# Patient Record
Sex: FEMALE | Race: WHITE | NOT HISPANIC OR LATINO
[De-identification: names, ages, dates, MRNs, and addresses within clinical notes are randomized per-mention and may not be internally consistent; named-entity substitution may affect disease eponyms.]

---

## 2019-05-13 PROBLEM — Z00.00 ENCOUNTER FOR PREVENTIVE HEALTH EXAMINATION: Status: ACTIVE | Noted: 2019-05-13

## 2019-06-08 ENCOUNTER — LABORATORY RESULT (OUTPATIENT)
Age: 49
End: 2019-06-08

## 2019-06-08 ENCOUNTER — OUTPATIENT (OUTPATIENT)
Dept: OUTPATIENT SERVICES | Facility: HOSPITAL | Age: 49
LOS: 1 days | Discharge: HOME | End: 2019-06-08

## 2019-06-08 DIAGNOSIS — Z01.419 ENCOUNTER FOR GYNECOLOGICAL EXAMINATION (GENERAL) (ROUTINE) WITHOUT ABNORMAL FINDINGS: ICD-10-CM

## 2019-06-12 ENCOUNTER — APPOINTMENT (OUTPATIENT)
Dept: OBGYN | Facility: CLINIC | Age: 49
End: 2019-06-12

## 2019-06-25 ENCOUNTER — LABORATORY RESULT (OUTPATIENT)
Age: 49
End: 2019-06-25

## 2019-06-25 ENCOUNTER — OUTPATIENT (OUTPATIENT)
Dept: OUTPATIENT SERVICES | Facility: HOSPITAL | Age: 49
LOS: 1 days | Discharge: HOME | End: 2019-06-25

## 2019-06-25 ENCOUNTER — APPOINTMENT (OUTPATIENT)
Dept: OBGYN | Facility: CLINIC | Age: 49
End: 2019-06-25
Payer: COMMERCIAL

## 2019-06-25 VITALS — BODY MASS INDEX: 22.82 KG/M2 | WEIGHT: 137 LBS | HEIGHT: 65 IN

## 2019-06-25 PROCEDURE — 99396 PREV VISIT EST AGE 40-64: CPT

## 2019-06-26 DIAGNOSIS — Z01.419 ENCOUNTER FOR GYNECOLOGICAL EXAMINATION (GENERAL) (ROUTINE) WITHOUT ABNORMAL FINDINGS: ICD-10-CM

## 2019-07-18 ENCOUNTER — APPOINTMENT (OUTPATIENT)
Dept: INFUSION THERAPY | Facility: CLINIC | Age: 49
End: 2019-07-18
Payer: COMMERCIAL

## 2019-07-18 ENCOUNTER — LABORATORY RESULT (OUTPATIENT)
Age: 49
End: 2019-07-18

## 2019-07-18 ENCOUNTER — APPOINTMENT (OUTPATIENT)
Dept: HEMATOLOGY ONCOLOGY | Facility: CLINIC | Age: 49
End: 2019-07-18
Payer: COMMERCIAL

## 2019-07-18 VITALS
BODY MASS INDEX: 23.32 KG/M2 | RESPIRATION RATE: 14 BRPM | HEIGHT: 65 IN | TEMPERATURE: 97.1 F | WEIGHT: 140 LBS | SYSTOLIC BLOOD PRESSURE: 127 MMHG | DIASTOLIC BLOOD PRESSURE: 69 MMHG | HEART RATE: 73 BPM

## 2019-07-18 PROCEDURE — 99215 OFFICE O/P EST HI 40 MIN: CPT

## 2019-07-18 RX ORDER — HYDROCORTISONE 20 MG
100 TABLET ORAL ONCE
Refills: 0 | Status: COMPLETED | OUTPATIENT
Start: 2019-07-18 | End: 2019-07-18

## 2019-07-18 RX ORDER — FERUMOXYTOL 510 MG/17ML
510 INJECTION INTRAVENOUS ONCE
Refills: 0 | Status: COMPLETED | OUTPATIENT
Start: 2019-07-18 | End: 2019-07-18

## 2019-07-18 RX ADMIN — Medication 150 MILLIGRAM(S): at 17:01

## 2019-07-18 RX ADMIN — FERUMOXYTOL 468 MILLIGRAM(S): 510 INJECTION INTRAVENOUS at 17:01

## 2019-07-18 NOTE — PHYSICAL EXAM
[Normal] : normoactive bowel sounds, soft and nontender, no hepatosplenomegaly or masses appreciated [de-identified] : no tenderness.

## 2019-07-18 NOTE — ASSESSMENT
[FreeTextEntry1] : 48 year old female with iron deficiency ( ferritin : 5 ) , slightly symptomatic , intolerant to po iron and with questionable history of  IBD . She is scheduled for colonoscopy . In the interim will recommend iron repletion with feraheme 510 mg X 2 . discussed the potential adverse effects especially mild hypersensitivity reaction . will check celiac serology and B12 . All her questions were addressed.

## 2019-07-18 NOTE — HISTORY OF PRESENT ILLNESS
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 Last colonoscopy >5 years ago showed non-specific colitis . She denies any pain or steatorrhea  at this time . Last blood work shows Hb: 10.5 , ferritin : 6 .\par She is intolerant to po iron .

## 2019-07-19 LAB
HCT VFR BLD CALC: 39.1 %
HGB BLD-MCNC: 11.9 G/DL
MCHC RBC-ENTMCNC: 23.8 PG
MCHC RBC-ENTMCNC: 30.4 G/DL
MCV RBC AUTO: 78 FL
PLATELET # BLD AUTO: 213 K/UL
PMV BLD: 12.4 FL
RBC # BLD: 5.01 M/UL
RBC # FLD: 15.2 %
VIT B12 SERPL-MCNC: 435 PG/ML
WBC # FLD AUTO: 7.77 K/UL

## 2019-07-21 LAB
GLIADIN IGA SER QL: 5.3 UNITS
GLIADIN IGG SER QL: 8.4 UNITS
GLIADIN PEPTIDE IGA SER-ACNC: NEGATIVE
GLIADIN PEPTIDE IGG SER-ACNC: NEGATIVE

## 2019-07-22 LAB — METHYLMALONATE SERPL-SCNC: 218 NMOL/L

## 2019-07-24 LAB
ENDOMYSIUM IGA SER QL: NEGATIVE
ENDOMYSIUM IGA TITR SER: NORMAL

## 2019-07-25 ENCOUNTER — APPOINTMENT (OUTPATIENT)
Dept: INFUSION THERAPY | Facility: CLINIC | Age: 49
End: 2019-07-25

## 2019-07-25 ENCOUNTER — OUTPATIENT (OUTPATIENT)
Dept: OUTPATIENT SERVICES | Facility: HOSPITAL | Age: 49
LOS: 1 days | Discharge: HOME | End: 2019-07-25

## 2019-07-25 DIAGNOSIS — D64.9 ANEMIA, UNSPECIFIED: ICD-10-CM

## 2019-07-25 RX ORDER — FERUMOXYTOL 510 MG/17ML
510 INJECTION INTRAVENOUS ONCE
Refills: 0 | Status: COMPLETED | OUTPATIENT
Start: 2019-07-25 | End: 2019-07-25

## 2019-07-25 RX ORDER — HYDROCORTISONE 20 MG
100 TABLET ORAL ONCE
Refills: 0 | Status: COMPLETED | OUTPATIENT
Start: 2019-07-25 | End: 2019-07-25

## 2019-07-25 RX ADMIN — Medication 150 MILLIGRAM(S): at 17:20

## 2019-07-25 RX ADMIN — FERUMOXYTOL 510 MILLIGRAM(S): 510 INJECTION INTRAVENOUS at 18:10

## 2019-07-25 RX ADMIN — FERUMOXYTOL 468 MILLIGRAM(S): 510 INJECTION INTRAVENOUS at 17:40

## 2019-07-25 RX ADMIN — Medication 100 MILLIGRAM(S): at 17:40

## 2019-09-19 ENCOUNTER — APPOINTMENT (OUTPATIENT)
Dept: HEMATOLOGY ONCOLOGY | Facility: CLINIC | Age: 49
End: 2019-09-19

## 2020-06-21 ENCOUNTER — EMERGENCY (EMERGENCY)
Facility: HOSPITAL | Age: 50
LOS: 0 days | Discharge: HOME | End: 2020-06-21
Attending: EMERGENCY MEDICINE | Admitting: EMERGENCY MEDICINE
Payer: COMMERCIAL

## 2020-06-21 VITALS
TEMPERATURE: 99 F | SYSTOLIC BLOOD PRESSURE: 127 MMHG | DIASTOLIC BLOOD PRESSURE: 78 MMHG | OXYGEN SATURATION: 99 % | HEART RATE: 75 BPM | RESPIRATION RATE: 17 BRPM | WEIGHT: 134.92 LBS | HEIGHT: 65 IN

## 2020-06-21 DIAGNOSIS — S61.412A LACERATION WITHOUT FOREIGN BODY OF LEFT HAND, INITIAL ENCOUNTER: ICD-10-CM
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
## 2020-06-21 PROCEDURE — 99283 EMERGENCY DEPT VISIT LOW MDM: CPT | Mod: 25

## 2020-06-21 PROCEDURE — 12001 RPR S/N/AX/GEN/TRNK 2.5CM/<: CPT

## 2020-06-21 NOTE — ED PROVIDER NOTE - CARE PROVIDER_API CALL
Urgent care or PMD,   10 days for suture removal  Phone: (   )    -  Fax: (   )    -  Follow Up Time:

## 2020-06-21 NOTE — ED PROVIDER NOTE - ATTENDING CONTRIBUTION TO CARE
50 yo F presents with c/o laceration to the left hand sustained PTA on a vase.  Pt states vase fell and broke when it hit the faucet, no numbness or tingling.  On exam pt in NAD AAO x 3, + laceration to dorsum left hand overlying 3rd MC. Tendon seen and intact, + 2nd laceration dorsum hand, + superficial laceration to left pinky, Good ROM x all digits, sensation intact

## 2020-06-21 NOTE — ED PROVIDER NOTE - PROVIDER TOKENS
FREE:[LAST:[Urgent care or PMD],PHONE:[(   )    -],FAX:[(   )    -],ADDRESS:[10 days for suture removal]]

## 2020-06-21 NOTE — ED PROVIDER NOTE - OBJECTIVE STATEMENT
50 yo female, no pmh, utd, presents to ed for left hand lac, dorsum, mild, no active bleeding, cut on vase. denies limited rom, numbness, tingling.

## 2020-06-21 NOTE — ED PROVIDER NOTE - PATIENT PORTAL LINK FT
You can access the FollowMyHealth Patient Portal offered by [facility] by registering at the following website: [email or web address]. By joining [employer]’s FollowMyHealth portal, you will also be able to view your health information using other applications (apps) compatible with our system.

## 2020-06-21 NOTE — ED PROVIDER NOTE - PHYSICAL EXAMINATION
Physical Exam    Vital Signs: I have reviewed the initial vital signs.  Constitutional: well-nourished, appears stated age, no acute distress  Eyes: Conjunctiva pink, Sclera clear,   Cardiovascular:  radial pulses equal and 2+  Musculoskeletal: from of left hand, no evidence of tendon injury  Integumentary: 1 cm lac to dorsum of left hand with another .3 cm lac to left hand on dorsum side.   Neurologic: awake, alert, nvi

## 2020-11-18 ENCOUNTER — LABORATORY RESULT (OUTPATIENT)
Age: 50
End: 2020-11-18

## 2020-11-19 ENCOUNTER — APPOINTMENT (OUTPATIENT)
Dept: OBGYN | Facility: CLINIC | Age: 50
End: 2020-11-19
Payer: COMMERCIAL

## 2020-11-19 VITALS — TEMPERATURE: 97.7 F | WEIGHT: 156 LBS | HEIGHT: 65 IN | BODY MASS INDEX: 25.99 KG/M2

## 2020-11-19 DIAGNOSIS — N63.0 UNSPECIFIED LUMP IN UNSPECIFIED BREAST: ICD-10-CM

## 2020-11-19 PROCEDURE — 99396 PREV VISIT EST AGE 40-64: CPT

## 2020-12-07 ENCOUNTER — NON-APPOINTMENT (OUTPATIENT)
Age: 50
End: 2020-12-07

## 2020-12-09 ENCOUNTER — RESULT REVIEW (OUTPATIENT)
Age: 50
End: 2020-12-09

## 2020-12-09 ENCOUNTER — OUTPATIENT (OUTPATIENT)
Dept: OUTPATIENT SERVICES | Facility: HOSPITAL | Age: 50
LOS: 1 days | Discharge: HOME | End: 2020-12-09
Payer: COMMERCIAL

## 2020-12-09 PROCEDURE — 88342 IMHCHEM/IMCYTCHM 1ST ANTB: CPT | Mod: 26

## 2020-12-09 PROCEDURE — 19082 BX BREAST ADD LESION STRTCTC: CPT | Mod: LT

## 2020-12-09 PROCEDURE — 19081 BX BREAST 1ST LESION STRTCTC: CPT | Mod: LT

## 2020-12-09 PROCEDURE — 88305 TISSUE EXAM BY PATHOLOGIST: CPT | Mod: 26

## 2020-12-09 PROCEDURE — 88341 IMHCHEM/IMCYTCHM EA ADD ANTB: CPT | Mod: 26

## 2020-12-10 ENCOUNTER — NON-APPOINTMENT (OUTPATIENT)
Age: 50
End: 2020-12-10

## 2020-12-11 LAB — SURGICAL PATHOLOGY STUDY: SIGNIFICANT CHANGE UP

## 2020-12-11 NOTE — HISTORY OF PRESENT ILLNESS
[FreeTextEntry1] : Stereo Guided Core Bx - 12/09/2020:\par Left, lateral Ca++: (mini-cork)\par Single microscopic focus of atypical lobular hyperplasia\par (ALH), measuring 0.8 mm (microscopic measurement) in this biopsy\par material (see comment).\par -  Proliferative type fibrocystic changes including usual type\par duct hyperplasia, stromal fibrosis, sclerosing and blunt duct\par adenosis, columnar cell change/hyperplasia without atypia, and\par numerous microcalcifications present in benign ducts.\par -  Unremarkable skeletal muscle.\par -  Radiographic/pathologic correlation with the specimen's\par digital image viewed on PACS is performed.\par \par Comment: The diagnosis is supported by immunohistochemical stain\par negative and/or weak/diminished for E-cadherin in the focus of\par in-situ atypical lobular neoplasia (ALH and/or LCIS).\par \par Left, medial Ca++: (top-hat)\par -  Mild focal ductal epithelial atypia (ADH) arising in a\par background of columnar cell change/hyperplasia, associated with\par psammomatous microcalcifications (see comment).\par -  Focal atypical lobular hyperplasia (ALH, see comment).\par -  Proliferative type fibrocystic changes including usual type\par duct hyperplasia, stromal fibrosis with foci of pseudoangiomatous\par stromal hyperplasia (PASH), sclerosing adenosis, focal\par fibroadenomatoid mastopathy (sclerosing lobular hyperplasia), and\par microcalcifications.\par -  Focal mammary duct ectasia.\par -  Radiographic/pathologic correlation with the specimen's\par digital image viewed on PACS is performed.\par \par Comment: The diagnosis is supported by immunostains negative for\par CK 5/6 and diffusely strongly positive for ER in the foci of\par ductal epithelial atypia (ADH); along with an immunohistochemical\par stain negative and/or weak/diminished for E-cadherin in the foci\par of in-situ atypical lobular neoplasia (ALH and/or LCIS).

## 2020-12-11 NOTE — DATA REVIEWED
[FreeTextEntry1] : B/L Dx Mammo & Sono - 12/03/2020: (REGIONAL)\par MAMMOGRAM: \par  \par Tomosynthesis and 2D imaging of the breast(s) were performed.  Current study was also evaluated with a computer aided detection (CAD) system.\par  \par Breast Density: Heterogeneously dense, which may obscure small masses.\par  \par Examination of the right breast reveals a subtle 2.4 cm ovoid density in the posterior upper outer quadrant, area of palpable concern. This was shown to represent a simple cyst on subsequent ultrasound. There are no suspicious microcalcifications in the right breast\par  \par Examination of the left breast reveals new grouped indeterminate microcalcifications in the anterior medial aspect. On magnification they are indeterminant and further assessment with stereotactic biopsy is recommended.\par  \par  \par US BREAST COMPLETE BILATERAL\par  \par Ultrasound evaluation was performed including examination of all four quadrants of the breast(s) and the retroareolar regions.\par  \par Color flow, Gray scale and real-time ultrasound of both breasts was performed. \par  \par No suspicious solid abnormalities were seen sonographically in either breast. \par  \par There are numerous cysts throughout both breasts.\par  \par The largest simple cyst in the right breast is in the 10:00 location at 7 cm from the nipple. This cyst would account for the palpable area of concern and measures 2 cm. Adjacent smaller cysts in this area are also noted. The remainder of the right breast shows scattered subcentimeter simple cysts.\par  \par In the left breast the largest cyst measures 1.8 cm and is located in the 3:00 retroareolar region. This cyst has a thin internal septation. Other simple cysts in the left breast are subcentimeter in size\par  \par OVERALL IMPRESSION:\par  \par Of significance is the presence of a small area of grouped calcifications in the medial anterior left breast at approximately 9:00. Further evaluation with stereotactic biopsy is recommended. The findings and recommendations were discussed directly with the patient by the dictating radiologist\par  \par There is no mammographic or sonographic evidence of malignancy in the right breast. Palpable area of concern in the right breast is secondary to a 2 cm simple cyst in the 10:00 location.\par  \par Biopsy of the left breast(s) is recommended. A letter will be sent to the patient to return for a biopsy.\par  \par The findings and recommendations were discussed with the patient.\par  \par BI-RADS 4: SUSPICIOUS\par \par Stereo Guided Core Bx - 12/09/2020:\par Left, lateral Ca++: (mini-cork)\par Single microscopic focus of atypical lobular hyperplasia\par (ALH), measuring 0.8 mm (microscopic measurement) in this biopsy\par material (see comment).\par -  Proliferative type fibrocystic changes including usual type\par duct hyperplasia, stromal fibrosis, sclerosing and blunt duct\par adenosis, columnar cell change/hyperplasia without atypia, and\par numerous microcalcifications present in benign ducts.\par -  Unremarkable skeletal muscle.\par -  Radiographic/pathologic correlation with the specimen's\par digital image viewed on PACS is performed.\par \par Comment: The diagnosis is supported by immunohistochemical stain\par negative and/or weak/diminished for E-cadherin in the focus of\par in-situ atypical lobular neoplasia (ALH and/or LCIS).\par \par Left, medial Ca++: (top-hat)\par -  Mild focal ductal epithelial atypia (ADH) arising in a\par background of columnar cell change/hyperplasia, associated with\par psammomatous microcalcifications (see comment).\par -  Focal atypical lobular hyperplasia (ALH, see comment).\par -  Proliferative type fibrocystic changes including usual type\par duct hyperplasia, stromal fibrosis with foci of pseudoangiomatous\par stromal hyperplasia (PASH), sclerosing adenosis, focal\par fibroadenomatoid mastopathy (sclerosing lobular hyperplasia), and\par microcalcifications.\par -  Focal mammary duct ectasia.\par -  Radiographic/pathologic correlation with the specimen's\par digital image viewed on PACS is performed.\par \par Comment: The diagnosis is supported by immunostains negative for\par CK 5/6 and diffusely strongly positive for ER in the foci of\par ductal epithelial atypia (ADH); along with an immunohistochemical\par stain negative and/or weak/diminished for E-cadherin in the foci\par of in-situ atypical lobular neoplasia (ALH and/or LCIS).

## 2020-12-14 ENCOUNTER — APPOINTMENT (OUTPATIENT)
Dept: BREAST CENTER | Facility: CLINIC | Age: 50
End: 2020-12-14
Payer: COMMERCIAL

## 2020-12-14 VITALS
TEMPERATURE: 97.5 F | DIASTOLIC BLOOD PRESSURE: 70 MMHG | WEIGHT: 140 LBS | BODY MASS INDEX: 23.32 KG/M2 | SYSTOLIC BLOOD PRESSURE: 124 MMHG | HEIGHT: 65 IN

## 2020-12-14 DIAGNOSIS — N63.20 UNSPECIFIED LUMP IN THE LEFT BREAST, UNSPECIFIED QUADRANT: ICD-10-CM

## 2020-12-14 PROCEDURE — 99072 ADDL SUPL MATRL&STAF TM PHE: CPT

## 2020-12-14 PROCEDURE — 99203 OFFICE O/P NEW LOW 30 MIN: CPT

## 2020-12-15 ENCOUNTER — NON-APPOINTMENT (OUTPATIENT)
Age: 50
End: 2020-12-15

## 2020-12-21 ENCOUNTER — LABORATORY RESULT (OUTPATIENT)
Age: 50
End: 2020-12-21

## 2020-12-30 ENCOUNTER — APPOINTMENT (OUTPATIENT)
Dept: OBGYN | Facility: CLINIC | Age: 50
End: 2020-12-30
Payer: COMMERCIAL

## 2020-12-30 PROCEDURE — 99072 ADDL SUPL MATRL&STAF TM PHE: CPT

## 2020-12-30 PROCEDURE — 76830 TRANSVAGINAL US NON-OB: CPT

## 2020-12-30 PROCEDURE — 76705 ECHO EXAM OF ABDOMEN: CPT

## 2021-01-13 ENCOUNTER — NON-APPOINTMENT (OUTPATIENT)
Age: 51
End: 2021-01-13

## 2021-01-18 ENCOUNTER — RESULT REVIEW (OUTPATIENT)
Age: 51
End: 2021-01-18

## 2021-01-18 ENCOUNTER — OUTPATIENT (OUTPATIENT)
Dept: OUTPATIENT SERVICES | Facility: HOSPITAL | Age: 51
LOS: 1 days | Discharge: HOME | End: 2021-01-18
Payer: COMMERCIAL

## 2021-01-18 VITALS
OXYGEN SATURATION: 98 % | SYSTOLIC BLOOD PRESSURE: 122 MMHG | WEIGHT: 139.33 LBS | HEIGHT: 65 IN | HEART RATE: 98 BPM | TEMPERATURE: 97 F | DIASTOLIC BLOOD PRESSURE: 73 MMHG | RESPIRATION RATE: 187 BRPM

## 2021-01-18 DIAGNOSIS — Z98.891 HISTORY OF UTERINE SCAR FROM PREVIOUS SURGERY: Chronic | ICD-10-CM

## 2021-01-18 DIAGNOSIS — N60.89 OTHER BENIGN MAMMARY DYSPLASIAS OF UNSPECIFIED BREAST: ICD-10-CM

## 2021-01-18 DIAGNOSIS — Z01.818 ENCOUNTER FOR OTHER PREPROCEDURAL EXAMINATION: ICD-10-CM

## 2021-01-18 LAB
APTT BLD: 27.4 SEC — SIGNIFICANT CHANGE UP (ref 27–39.2)
INR BLD: 1.1 RATIO — SIGNIFICANT CHANGE UP (ref 0.65–1.3)
PROTHROM AB SERPL-ACNC: 12.7 SEC — SIGNIFICANT CHANGE UP (ref 9.95–12.87)

## 2021-01-18 PROCEDURE — 93010 ELECTROCARDIOGRAM REPORT: CPT

## 2021-01-18 PROCEDURE — 71046 X-RAY EXAM CHEST 2 VIEWS: CPT | Mod: 26

## 2021-01-18 NOTE — H&P PST ADULT - REASON FOR ADMISSION
Tony Bryanna is a 49 yo F presents to PAST for Left breast Lumpectomy with needle localization of two areas under LSB at [facility] OR by Dr. MCMILLAN on 02/12/2021  Covid Testing on 02/9/2021 at 0730.

## 2021-01-18 NOTE — H&P PST ADULT - HISTORY OF PRESENT ILLNESS
Patient with no significant PMH presents to PAST for the above procedure due to left breast mass, Patient denies any c/o cp, sob, palpitations, fever, cough or dysuria. Ex tolerance of 2 fos walk with out SOB. NO DAVID.   Patient denies any covid s/s, or tested positive in the past  Patient advised self quarantine till day of procedure  PT DENIES ANY RASHES, ABRASION, OR OPEN WOUNDS OR CUTS  AS PER THE PT, THIS IS HIS/HER COMPLETE MEDICAL AND SURGICAL HX, INCLUDING MEDICATIONS PRESCRIBED AND OVER THE COUNTER  Anesthesia Alert  NO--Difficult Airway  NO--History of neck surgery or radiation  NO--Limited ROM of neck  NO--History of Malignant hyperthermia  NO--Personal or family history of Pseudocholinesterase deficiency  NO--Prior Anesthesia Complication  NO--Latex Allergy  NO--Loose teeth  NO--History of Rheumatoid Arthritis  NO--DAVID  NO--Other_____

## 2021-01-18 NOTE — H&P PST ADULT - BP NONINVASIVE MEAN (MM HG)
Detail Level: Detailed
Render Post-Care Instructions In Note?: no
89
Medical Necessity Information: It is in your best interest to select a reason for this procedure from the list below. All of these items fulfill various CMS LCD requirements except the new and changing color options.
Consent: The patient's consent was obtained including but not limited to risks of crusting, scabbing, blistering, scarring, darker or lighter pigmentary change, recurrence, incomplete removal and infection.
Medical Necessity Clause: This procedure was medically necessary because the lesions that were treated were:
Post-Care Instructions: I reviewed with the patient in detail post-care instructions. Patient is to wear sunprotection, and avoid picking at any of the treated lesions. Pt may apply Vaseline to crusted or scabbing areas.

## 2021-01-18 NOTE — H&P PST ADULT - NSICDXPASTMEDICALHX_GEN_ALL_CORE_FT
Requested Prescriptions     Pending Prescriptions Disp Refills    warfarin (COUMADIN) 2.5 mg tablet 30 Tab 5     Sig: TAKE 1 TABLET BY MOUTH ONCE DAILY
PAST MEDICAL HISTORY:  Anemia Iron def    H/O vitamin D deficiency

## 2021-01-19 ENCOUNTER — NON-APPOINTMENT (OUTPATIENT)
Age: 51
End: 2021-01-19

## 2021-01-21 ENCOUNTER — NON-APPOINTMENT (OUTPATIENT)
Age: 51
End: 2021-01-21

## 2021-02-09 ENCOUNTER — LABORATORY RESULT (OUTPATIENT)
Age: 51
End: 2021-02-09

## 2021-02-09 ENCOUNTER — OUTPATIENT (OUTPATIENT)
Dept: OUTPATIENT SERVICES | Facility: HOSPITAL | Age: 51
LOS: 1 days | Discharge: HOME | End: 2021-02-09

## 2021-02-09 DIAGNOSIS — Z98.891 HISTORY OF UTERINE SCAR FROM PREVIOUS SURGERY: Chronic | ICD-10-CM

## 2021-02-09 DIAGNOSIS — Z11.59 ENCOUNTER FOR SCREENING FOR OTHER VIRAL DISEASES: ICD-10-CM

## 2021-02-09 PROBLEM — Z86.39 PERSONAL HISTORY OF OTHER ENDOCRINE, NUTRITIONAL AND METABOLIC DISEASE: Chronic | Status: ACTIVE | Noted: 2021-01-18

## 2021-02-09 PROBLEM — D64.9 ANEMIA, UNSPECIFIED: Chronic | Status: ACTIVE | Noted: 2021-01-18

## 2021-02-12 ENCOUNTER — RESULT REVIEW (OUTPATIENT)
Age: 51
End: 2021-02-12

## 2021-02-12 ENCOUNTER — APPOINTMENT (OUTPATIENT)
Dept: BREAST CENTER | Facility: AMBULATORY SURGERY CENTER | Age: 51
End: 2021-02-12
Payer: COMMERCIAL

## 2021-02-12 ENCOUNTER — OUTPATIENT (OUTPATIENT)
Dept: OUTPATIENT SERVICES | Facility: HOSPITAL | Age: 51
LOS: 1 days | Discharge: HOME | End: 2021-02-12
Payer: COMMERCIAL

## 2021-02-12 VITALS
SYSTOLIC BLOOD PRESSURE: 120 MMHG | HEART RATE: 58 BPM | DIASTOLIC BLOOD PRESSURE: 82 MMHG | RESPIRATION RATE: 15 BRPM | OXYGEN SATURATION: 100 %

## 2021-02-12 VITALS
SYSTOLIC BLOOD PRESSURE: 139 MMHG | OXYGEN SATURATION: 98 % | HEIGHT: 65 IN | WEIGHT: 139.33 LBS | DIASTOLIC BLOOD PRESSURE: 80 MMHG | TEMPERATURE: 98 F | HEART RATE: 98 BPM | RESPIRATION RATE: 18 BRPM

## 2021-02-12 DIAGNOSIS — Z98.890 OTHER SPECIFIED POSTPROCEDURAL STATES: ICD-10-CM

## 2021-02-12 DIAGNOSIS — Z98.891 HISTORY OF UTERINE SCAR FROM PREVIOUS SURGERY: Chronic | ICD-10-CM

## 2021-02-12 PROCEDURE — 19282 PERQ DEVICE BREAST EA IMAG: CPT | Mod: LT

## 2021-02-12 PROCEDURE — 88305 TISSUE EXAM BY PATHOLOGIST: CPT | Mod: 26

## 2021-02-12 PROCEDURE — 19125 EXCISION BREAST LESION: CPT | Mod: LT

## 2021-02-12 PROCEDURE — 19281 PERQ DEVICE BREAST 1ST IMAG: CPT | Mod: LT

## 2021-02-12 RX ORDER — SODIUM CHLORIDE 9 MG/ML
1000 INJECTION, SOLUTION INTRAVENOUS
Refills: 0 | Status: DISCONTINUED | OUTPATIENT
Start: 2021-02-12 | End: 2021-02-26

## 2021-02-12 RX ORDER — HYDROMORPHONE HYDROCHLORIDE 2 MG/ML
0.5 INJECTION INTRAMUSCULAR; INTRAVENOUS; SUBCUTANEOUS
Refills: 0 | Status: DISCONTINUED | OUTPATIENT
Start: 2021-02-12 | End: 2021-02-12

## 2021-02-12 RX ORDER — OXYCODONE AND ACETAMINOPHEN 5; 325 MG/1; MG/1
5-325 TABLET ORAL
Qty: 10 | Refills: 0 | Status: ACTIVE | COMMUNITY
Start: 2021-02-12 | End: 1900-01-01

## 2021-02-12 RX ORDER — ONDANSETRON 8 MG/1
4 TABLET, FILM COATED ORAL ONCE
Refills: 0 | Status: DISCONTINUED | OUTPATIENT
Start: 2021-02-12 | End: 2021-02-26

## 2021-02-12 RX ORDER — OXYCODONE AND ACETAMINOPHEN 5; 325 MG/1; MG/1
1 TABLET ORAL EVERY 4 HOURS
Refills: 0 | Status: DISCONTINUED | OUTPATIENT
Start: 2021-02-12 | End: 2021-02-12

## 2021-02-12 NOTE — BRIEF OPERATIVE NOTE - NSICDXBRIEFPROCEDURE_GEN_ALL_CORE_FT
PROCEDURES:  Lumpectomy of left breast after needle localization 12-Feb-2021 11:56:51  Leilani Mcdonald  Lumpectomy of left breast after needle localization 12-Feb-2021 11:56:35  Leilani Mcdonald

## 2021-02-12 NOTE — CHART NOTE - NSCHARTNOTEFT_GEN_A_CORE
PACU ANESTHESIA ADMISSION NOTE      Procedure: Lumpectomy of left breast after needle localization    Lumpectomy of left breast after needle localization      Post op diagnosis:      ____  Intubated  TV:______       Rate: ______      FiO2: ______    _x___  Patent Airway    _x___  Full return of protective reflexes    _x___  Full recovery from anesthesia / back to baseline status    Vitals:            T:  98.3              BP : 113/64               R: 20             Sat: 96              P:71      Mental Status:  _x___ Awake   _____ Alert   _____ Drowsy   _____ Sedated    Nausea/Vomiting:  _x___  NO       ______Yes,   See Post - Op Orders         Pain Scale (0-10):  __0___    Treatment: _x___ None    ____ See Post - Op/PCA Orders    Post - Operative Fluids:   __x__ Oral   ____ See Post - Op Orders    Plan: Discharge:   _x___Home       _____Floor     _____Critical Care    _____  Other:_________________    Comments:  No anesthesia issues or complications noted.  Discharge when criteria met.

## 2021-02-12 NOTE — ASU DISCHARGE PLAN (ADULT/PEDIATRIC) - CARE PROVIDER_API CALL
Leilani Mcdonald)  Surgery  [address], [address] [place] [address]  Phone: [phone]  Fax: [phone]  Follow Up Time:

## 2021-02-16 LAB — SURGICAL PATHOLOGY STUDY: SIGNIFICANT CHANGE UP

## 2021-02-18 DIAGNOSIS — D50.9 IRON DEFICIENCY ANEMIA, UNSPECIFIED: ICD-10-CM

## 2021-02-18 DIAGNOSIS — N60.32 FIBROSCLEROSIS OF LEFT BREAST: ICD-10-CM

## 2021-02-18 DIAGNOSIS — N60.22 FIBROADENOSIS OF LEFT BREAST: ICD-10-CM

## 2021-02-18 DIAGNOSIS — N63.20 UNSPECIFIED LUMP IN THE LEFT BREAST, UNSPECIFIED QUADRANT: ICD-10-CM

## 2021-02-18 DIAGNOSIS — N60.82 OTHER BENIGN MAMMARY DYSPLASIAS OF LEFT BREAST: ICD-10-CM

## 2021-02-18 DIAGNOSIS — N60.92 UNSPECIFIED BENIGN MAMMARY DYSPLASIA OF LEFT BREAST: ICD-10-CM

## 2021-02-18 DIAGNOSIS — N64.89 OTHER SPECIFIED DISORDERS OF BREAST: ICD-10-CM

## 2021-02-25 ENCOUNTER — APPOINTMENT (OUTPATIENT)
Dept: BREAST CENTER | Facility: CLINIC | Age: 51
End: 2021-02-25
Payer: COMMERCIAL

## 2021-02-25 VITALS
WEIGHT: 140 LBS | DIASTOLIC BLOOD PRESSURE: 74 MMHG | TEMPERATURE: 97.6 F | BODY MASS INDEX: 23.32 KG/M2 | SYSTOLIC BLOOD PRESSURE: 122 MMHG | HEIGHT: 65 IN

## 2021-02-25 PROCEDURE — 99024 POSTOP FOLLOW-UP VISIT: CPT

## 2021-02-25 NOTE — DATA REVIEWED
[FreeTextEntry1] : Surgical Final Report\par \par Final Diagnosis\par 1. Breast, left 9 o'clock lower inner quadrant mass, needle\par localized lumpectomy:\par - Focal atypical ductal hyperplasia (ADH), solid type and flat\par epithelial atypia (FEA); both associated with psammomatous\par microcalcifications; located 0.5 mm from the nearest inked and\par designated lateral surgical margin (microscopic measurement).\par - Multiple foci of atypical lobular hyperplasia (ALH).\par - Proliferative type fibrocystic changes including florid duct\par hyperplasia, stromal fibrosis, sclerosing adenosis,\par cystic/papillary apocrine metaplasia, and microcalcifications.\par - Healing prior biopsy site.\par \par 2. Breast, left upper outer quadrant 1 o'clock mass, needle\par localized lumpectomy:\par - Benign breast tissue with proliferative type fibrocystic\par changes including usual duct hyperplasia, stromal fibrosis,\par sclerosing and blunt duct adenosis, columnar cell change without\par atypia, and microcalcifications.\par - Focal gynecomastia- like hyperplasia.\par - Healing prior biopsy site with no residual epithelial atypia\par identified in this entirely submitted specimen.\par \par 3. Breast, left upper outer quadrant 1 o'clock medial margin,\par excision:\par - Benign predominantly fatty breast tissue without\par histopathologic abnormality.\par - Negative for epithelial atypia.\par \par 4. Breast, left upper outer quadrant, 1 o'clock superior margin,\par excision:\par - Benign fibroadipose connective tissue without histopathologic\par abnormality.\par

## 2021-02-25 NOTE — ASSESSMENT
[FreeTextEntry1] : LUANNE is a joana 50 year old patient who presented today to the office for her initial post-operative visit.\par She is feeling well.\par She denies any fever / chills or erythema and / or drainage related to the incisions.\par Her pain is well controlled, only complains of mild soreness of the area.\par Her sutures were removed and pathology was discussed.\par \par Plan:\par 1. Left Uni Dx Mammo and Sono will be ordered for August 2021.\par 2. Follow-up and clinical breast exam to be scheduled for August 2021.\par 3. We discussed high risk screening with B/L breast MRI - this will be initiated in 2022. \par \par I spent a total of 15 minutes of face to face time with this patient, greater than 50% of which was spent in counseling and/or coordination of care.\par All of her questions were appropriately answered.\par She knows to call with any concerns.

## 2021-02-25 NOTE — REVIEW OF SYSTEMS
[Fever] : no fever [Chills] : no chills [Breast Pain] : no breast pain [Breast Lump] : no breast lump [Breast Swelling] : no breast swelling [Breast Reddening] : no reddening of the breast [Negative] : Constitutional

## 2021-02-25 NOTE — PAST MEDICAL HISTORY
[Menstruating] : The patient is menstruating [Menarche Age ____] : age at menarche was [unfilled] [Total Preg ___] : G[unfilled] [Live Births ___] : P[unfilled]  [Age At Live Birth ___] : Age at live birth: [unfilled] [FreeTextEntry6] : No. [FreeTextEntry7] : Yes; 1 year. [FreeTextEntry8] : Yes.

## 2021-02-25 NOTE — HISTORY OF PRESENT ILLNESS
[FreeTextEntry1] : GYN: Dr. Dada Mazariegos\par \par s/p Stereo Guided Core Bx - 12/09/2020:\par Left, lateral Ca++: (mini-cork)\par Single microscopic focus of atypical lobular hyperplasia\par (ALH), measuring 0.8 mm (microscopic measurement) in this biopsy\par material (see comment).\par Proliferative type fibrocystic changes including usual type duct hyperplasia.\par \par Left, medial Ca++: (top-hat)\par Mild focal ductal epithelial atypia (ADH) \par Focal atypical lobular hyperplasia (ALH, see comment).\par Proliferative type fibrocystic changes including usual type\par duct hyperplasia.\par \par Babs Model Risk Assessment:\par 5 yr - 5.1% \par Life - 35.2%\par \par s/p Left NLOC x 2 - 02/12/2021.

## 2021-05-28 DIAGNOSIS — N39.0 URINARY TRACT INFECTION, SITE NOT SPECIFIED: ICD-10-CM

## 2021-05-28 RX ORDER — NITROFURANTOIN (MONOHYDRATE/MACROCRYSTALS) 25; 75 MG/1; MG/1
100 CAPSULE ORAL
Qty: 14 | Refills: 0 | Status: ACTIVE | COMMUNITY
Start: 2021-05-28 | End: 1900-01-01

## 2021-06-16 ENCOUNTER — TRANSCRIPTION ENCOUNTER (OUTPATIENT)
Age: 51
End: 2021-06-16

## 2021-09-07 ENCOUNTER — OUTPATIENT (OUTPATIENT)
Dept: OUTPATIENT SERVICES | Facility: HOSPITAL | Age: 51
LOS: 1 days | Discharge: HOME | End: 2021-09-07
Payer: COMMERCIAL

## 2021-09-07 ENCOUNTER — RESULT REVIEW (OUTPATIENT)
Age: 51
End: 2021-09-07

## 2021-09-07 DIAGNOSIS — R92.8 OTHER ABNORMAL AND INCONCLUSIVE FINDINGS ON DIAGNOSTIC IMAGING OF BREAST: ICD-10-CM

## 2021-09-07 DIAGNOSIS — Z98.891 HISTORY OF UTERINE SCAR FROM PREVIOUS SURGERY: Chronic | ICD-10-CM

## 2021-09-07 PROCEDURE — G0279: CPT | Mod: 26

## 2021-09-07 PROCEDURE — 76641 ULTRASOUND BREAST COMPLETE: CPT | Mod: 26,LT

## 2021-09-07 PROCEDURE — 77065 DX MAMMO INCL CAD UNI: CPT | Mod: 26,LT

## 2021-09-09 ENCOUNTER — APPOINTMENT (OUTPATIENT)
Dept: BREAST CENTER | Facility: CLINIC | Age: 51
End: 2021-09-09
Payer: COMMERCIAL

## 2021-09-09 VITALS
WEIGHT: 140 LBS | SYSTOLIC BLOOD PRESSURE: 122 MMHG | TEMPERATURE: 98.7 F | BODY MASS INDEX: 23.32 KG/M2 | DIASTOLIC BLOOD PRESSURE: 70 MMHG | HEIGHT: 65 IN

## 2021-09-09 DIAGNOSIS — Z12.39 ENCOUNTER FOR OTHER SCREENING FOR MALIGNANT NEOPLASM OF BREAST: ICD-10-CM

## 2021-09-09 DIAGNOSIS — N60.82 OTHER BENIGN MAMMARY DYSPLASIAS OF LEFT BREAST: ICD-10-CM

## 2021-09-09 DIAGNOSIS — N60.92 UNSPECIFIED BENIGN MAMMARY DYSPLASIA OF LEFT BREAST: ICD-10-CM

## 2021-09-09 PROCEDURE — 99212 OFFICE O/P EST SF 10 MIN: CPT

## 2021-09-10 PROBLEM — N60.82 FLAT EPITHELIAL ATYPIA (FEA) OF LEFT BREAST: Status: ACTIVE | Noted: 2021-09-10

## 2021-09-10 PROBLEM — Z12.39 BREAST CANCER SCREENING, HIGH RISK PATIENT: Status: ACTIVE | Noted: 2021-09-09

## 2021-09-10 PROBLEM — N60.92 ATYPICAL DUCTAL HYPERPLASIA OF LEFT BREAST: Status: ACTIVE | Noted: 2021-02-25

## 2021-09-10 NOTE — REASON FOR VISIT
[Follow-Up: _____] : a [unfilled] follow-up visit [FreeTextEntry1] : h/o Left breast ADH, ALH, FEA; high risk screening; imaging review.

## 2021-09-10 NOTE — DATA REVIEWED
[FreeTextEntry1] : Left Uni Dx Mammo & Sono - 09/07/2021:\par \par Breast composition:The breasts are heterogeneously dense, which may obscure small masses.\par \par Findings:\par \par Mammogram:\par \par Linear marker denotes the site of cutaneous scarring in the left breast. There is associated postsurgical distortion. No suspicious mass, microcalcifications or areas of architectural distortion is seen the left breast. When compared to the previous examinations there is no significant interval change and nothing to suggest malignancy.\par \par Ultrasound:\par \par Unilateral left whole breast ultrasound was performed.\par \par No suspicious solid or cystic masses. There is a benign cyst at the 1 to 2:00 position 7 cm from the nipple measuring 0.9 cm. No axillary adenopathy.\par \par Impression: No mammographic or sonographic evidence of malignancy. Postsurgical distortion in the left breast from recent left breast excisional biopsy.\par \par Recommendation: Unless otherwise indicated by clinical findings, the patient should resume annual screening in 3 months.\par \par BI-RADS Category 2: Benign

## 2021-09-10 NOTE — PHYSICAL EXAM
[Normocephalic] : normocephalic [Atraumatic] : atraumatic [No Supraclavicular Adenopathy] : no supraclavicular adenopathy [No dominant masses] : no dominant masses in right breast  [No dominant masses] : no dominant masses left breast [No Nipple Discharge] : no left nipple discharge [Breast Nipple Inversion] : nipples not inverted [Breast Nipple Retraction] : nipples not retracted [de-identified] : well healed surgical scars.\par palpable scar tissue.  [de-identified] : No axillary lymphadenopathy appreciated. [de-identified] : No axillary lymphadenopathy appreciated.

## 2021-09-10 NOTE — ASSESSMENT
[FreeTextEntry1] : LUANNE is a joana 50 year old patient who presented today in follow up for a history of Left breast ADH, ALH, FEA; high risk screening.\par Since her last visit, she has no new breast related complaints.  She continues to have occasional left breast pain, which comes and goes. \par Imaging of the left breast was done on 09/07/2021, which revealed no mammographic or sonographic evidence of malignancy. Postsurgical distortion in the left breast from recent left breast excisional biopsy, as detailed above. \par Physical exam was unrevealing today.\par \par Imaging with a bilateral screening mammogram and sonogram will be due in December 2021, and that will be scheduled today. \par We will plan to discuss these results via telephone.\par If benign, a bilateral breast MRI for high risk screening will be ordered for June 2022.\par She will return for follow-up and clinical breast exam in June 2022 as well.\par \par The patient was informed that Dr. Leilani Mcdonald will no longer be practicing here as of the end of August 2021; her care will be continued with the practice.\par \par I spent a total of 15 minutes of face to face time with this patient, greater than 50% of which was spent in counseling and/or coordination of care.\par All of her questions were appropriately answered.\par She knows to call with any concerns.\par \par \par \par \par \par

## 2021-09-10 NOTE — HISTORY OF PRESENT ILLNESS
[FreeTextEntry1] : GYN: Dr. Dada Mazariegos\par \par s/p Stereo Guided Core Bx - 12/09/2020:\par Left, lateral Ca++: (mini-cork)\par Single microscopic focus of atypical lobular hyperplasia\par (ALH), measuring 0.8 mm (microscopic measurement) in this biopsy\par material (see comment).\par Proliferative type fibrocystic changes including usual type duct hyperplasia.\par \par Left, medial Ca++: (top-hat)\par Mild focal ductal epithelial atypia (ADH) \par Focal atypical lobular hyperplasia (ALH, see comment).\par Proliferative type fibrocystic changes including usual type\par duct hyperplasia.\par \par (+) family hx - breast cancer - paternal grandmother.\par \par Babs Model Risk Assessment:\par 5 yr - 5.1% \par Life - 35.2%\par \par s/p Left NLOC x 2 - 02/12/2021 = Left LIQ Mass = focal ADH, and flat FEA; both associated with psammomatous\par microcalcifications; Multiple foci of ALH.  Healing prior biopsy site.  Left UOQ Mass = healing prior biopsy site with no residual epithelial atypia identified in this entirely submitted specimen.\par \par LUANNE HILLS is a 50 year old female patient who presents today in follow up for h/o Left breast ADH, ALH, FEA; high risk screening.\par Since her last visit, she has no new breast related complaints.  She continues to have occasional left breast pain, which comes and goes. \par Imaging of the left breast was done on 09/07/2021, which revealed no mammographic or sonographic evidence of malignancy. Postsurgical distortion in the left breast from recent left breast excisional biopsy.\par \par She presents today for evaluation and imaging review.\par \par

## 2021-12-01 ENCOUNTER — LABORATORY RESULT (OUTPATIENT)
Age: 51
End: 2021-12-01

## 2021-12-02 ENCOUNTER — APPOINTMENT (OUTPATIENT)
Dept: OBGYN | Facility: CLINIC | Age: 51
End: 2021-12-02
Payer: COMMERCIAL

## 2021-12-02 VITALS — WEIGHT: 138 LBS | TEMPERATURE: 97.7 F | BODY MASS INDEX: 22.99 KG/M2 | HEIGHT: 65 IN

## 2021-12-02 DIAGNOSIS — N60.92 UNSPECIFIED BENIGN MAMMARY DYSPLASIA OF LEFT BREAST: ICD-10-CM

## 2021-12-02 DIAGNOSIS — N94.10 UNSPECIFIED DYSPAREUNIA: ICD-10-CM

## 2021-12-02 PROCEDURE — 99396 PREV VISIT EST AGE 40-64: CPT

## 2021-12-02 RX ORDER — NITROFURANTOIN (MONOHYDRATE/MACROCRYSTALS) 25; 75 MG/1; MG/1
100 CAPSULE ORAL
Qty: 14 | Refills: 0 | Status: ACTIVE | COMMUNITY
Start: 2021-12-02 | End: 1900-01-01

## 2021-12-04 PROBLEM — N60.92 ATYPICAL LOBULAR HYPERPLASIA (ALH) OF LEFT BREAST: Status: ACTIVE | Noted: 2021-02-25

## 2021-12-04 PROBLEM — N94.10 DYSPAREUNIA, FEMALE: Status: ACTIVE | Noted: 2021-12-04

## 2021-12-13 ENCOUNTER — NON-APPOINTMENT (OUTPATIENT)
Age: 51
End: 2021-12-13

## 2021-12-13 ENCOUNTER — OUTPATIENT (OUTPATIENT)
Dept: OUTPATIENT SERVICES | Facility: HOSPITAL | Age: 51
LOS: 1 days | Discharge: HOME | End: 2021-12-13
Payer: COMMERCIAL

## 2021-12-13 ENCOUNTER — RESULT REVIEW (OUTPATIENT)
Age: 51
End: 2021-12-13

## 2021-12-13 DIAGNOSIS — Z12.31 ENCOUNTER FOR SCREENING MAMMOGRAM FOR MALIGNANT NEOPLASM OF BREAST: ICD-10-CM

## 2021-12-13 DIAGNOSIS — Z98.891 HISTORY OF UTERINE SCAR FROM PREVIOUS SURGERY: Chronic | ICD-10-CM

## 2021-12-13 DIAGNOSIS — R92.8 OTHER ABNORMAL AND INCONCLUSIVE FINDINGS ON DIAGNOSTIC IMAGING OF BREAST: ICD-10-CM

## 2021-12-13 PROCEDURE — 77063 BREAST TOMOSYNTHESIS BI: CPT | Mod: 26

## 2021-12-13 PROCEDURE — 76641 ULTRASOUND BREAST COMPLETE: CPT | Mod: 26,50

## 2021-12-13 PROCEDURE — 77067 SCR MAMMO BI INCL CAD: CPT | Mod: 26

## 2021-12-14 ENCOUNTER — OUTPATIENT (OUTPATIENT)
Dept: OUTPATIENT SERVICES | Facility: HOSPITAL | Age: 51
LOS: 1 days | Discharge: HOME | End: 2021-12-14
Payer: COMMERCIAL

## 2021-12-14 ENCOUNTER — NON-APPOINTMENT (OUTPATIENT)
Age: 51
End: 2021-12-14

## 2021-12-14 ENCOUNTER — RESULT REVIEW (OUTPATIENT)
Age: 51
End: 2021-12-14

## 2021-12-14 DIAGNOSIS — Z98.891 HISTORY OF UTERINE SCAR FROM PREVIOUS SURGERY: Chronic | ICD-10-CM

## 2021-12-14 DIAGNOSIS — R92.8 OTHER ABNORMAL AND INCONCLUSIVE FINDINGS ON DIAGNOSTIC IMAGING OF BREAST: ICD-10-CM

## 2021-12-14 PROCEDURE — G0279: CPT | Mod: 26

## 2021-12-14 PROCEDURE — 76642 ULTRASOUND BREAST LIMITED: CPT | Mod: 26,LT

## 2021-12-14 PROCEDURE — 77065 DX MAMMO INCL CAD UNI: CPT | Mod: 26,RT

## 2021-12-18 ENCOUNTER — LABORATORY RESULT (OUTPATIENT)
Age: 51
End: 2021-12-18

## 2021-12-27 ENCOUNTER — RESULT REVIEW (OUTPATIENT)
Age: 51
End: 2021-12-27

## 2021-12-27 ENCOUNTER — OUTPATIENT (OUTPATIENT)
Dept: OUTPATIENT SERVICES | Facility: HOSPITAL | Age: 51
LOS: 1 days | Discharge: HOME | End: 2021-12-27
Payer: COMMERCIAL

## 2021-12-27 DIAGNOSIS — Z98.891 HISTORY OF UTERINE SCAR FROM PREVIOUS SURGERY: Chronic | ICD-10-CM

## 2021-12-27 PROCEDURE — 88305 TISSUE EXAM BY PATHOLOGIST: CPT | Mod: 26

## 2021-12-27 PROCEDURE — 19083 BX BREAST 1ST LESION US IMAG: CPT | Mod: LT

## 2021-12-27 PROCEDURE — 77065 DX MAMMO INCL CAD UNI: CPT | Mod: 26,LT

## 2021-12-28 LAB — SURGICAL PATHOLOGY STUDY: SIGNIFICANT CHANGE UP

## 2021-12-29 DIAGNOSIS — D24.2 BENIGN NEOPLASM OF LEFT BREAST: ICD-10-CM

## 2021-12-29 DIAGNOSIS — N63.20 UNSPECIFIED LUMP IN THE LEFT BREAST, UNSPECIFIED QUADRANT: ICD-10-CM

## 2021-12-30 ENCOUNTER — RESULT REVIEW (OUTPATIENT)
Age: 51
End: 2021-12-30

## 2022-06-09 ENCOUNTER — OUTPATIENT (OUTPATIENT)
Dept: OUTPATIENT SERVICES | Facility: HOSPITAL | Age: 52
LOS: 1 days | Discharge: HOME | End: 2022-06-09
Payer: COMMERCIAL

## 2022-06-09 ENCOUNTER — RESULT REVIEW (OUTPATIENT)
Age: 52
End: 2022-06-09

## 2022-06-09 DIAGNOSIS — N60.92 UNSPECIFIED BENIGN MAMMARY DYSPLASIA OF LEFT BREAST: ICD-10-CM

## 2022-06-09 DIAGNOSIS — Z12.39 ENCOUNTER FOR OTHER SCREENING FOR MALIGNANT NEOPLASM OF BREAST: ICD-10-CM

## 2022-06-09 DIAGNOSIS — R92.8 OTHER ABNORMAL AND INCONCLUSIVE FINDINGS ON DIAGNOSTIC IMAGING OF BREAST: ICD-10-CM

## 2022-06-09 DIAGNOSIS — Z98.891 HISTORY OF UTERINE SCAR FROM PREVIOUS SURGERY: Chronic | ICD-10-CM

## 2022-06-09 PROCEDURE — 77065 DX MAMMO INCL CAD UNI: CPT | Mod: 26,RT

## 2022-06-09 PROCEDURE — 76641 ULTRASOUND BREAST COMPLETE: CPT | Mod: 26,RT

## 2022-06-09 PROCEDURE — G0279: CPT | Mod: 26

## 2022-06-09 PROCEDURE — 77049 MRI BREAST C-+ W/CAD BI: CPT | Mod: 26

## 2022-06-09 PROCEDURE — 76642 ULTRASOUND BREAST LIMITED: CPT | Mod: 26,LT

## 2022-11-16 ENCOUNTER — LABORATORY RESULT (OUTPATIENT)
Age: 52
End: 2022-11-16

## 2022-11-17 ENCOUNTER — APPOINTMENT (OUTPATIENT)
Dept: OBGYN | Facility: CLINIC | Age: 52
End: 2022-11-17

## 2022-11-17 VITALS — BODY MASS INDEX: 23.32 KG/M2 | TEMPERATURE: 97.6 F | HEIGHT: 65 IN | WEIGHT: 140 LBS

## 2022-11-17 DIAGNOSIS — D50.9 IRON DEFICIENCY ANEMIA, UNSPECIFIED: ICD-10-CM

## 2022-11-17 PROCEDURE — 99396 PREV VISIT EST AGE 40-64: CPT

## 2022-11-19 PROBLEM — D50.9 IRON DEFICIENCY ANEMIA, UNSPECIFIED IRON DEFICIENCY ANEMIA TYPE: Status: ACTIVE | Noted: 2022-11-19

## 2022-12-30 ENCOUNTER — RESULT REVIEW (OUTPATIENT)
Age: 52
End: 2022-12-30

## 2022-12-30 ENCOUNTER — OUTPATIENT (OUTPATIENT)
Dept: OUTPATIENT SERVICES | Facility: HOSPITAL | Age: 52
LOS: 1 days | Discharge: HOME | End: 2022-12-30
Payer: COMMERCIAL

## 2022-12-30 DIAGNOSIS — Z98.891 HISTORY OF UTERINE SCAR FROM PREVIOUS SURGERY: Chronic | ICD-10-CM

## 2022-12-30 DIAGNOSIS — R92.8 OTHER ABNORMAL AND INCONCLUSIVE FINDINGS ON DIAGNOSTIC IMAGING OF BREAST: ICD-10-CM

## 2022-12-30 PROCEDURE — 76641 ULTRASOUND BREAST COMPLETE: CPT | Mod: 26,50

## 2022-12-30 PROCEDURE — G0279: CPT | Mod: 26

## 2022-12-30 PROCEDURE — 77066 DX MAMMO INCL CAD BI: CPT | Mod: 26

## 2023-02-21 ENCOUNTER — LABORATORY RESULT (OUTPATIENT)
Age: 53
End: 2023-02-21

## 2023-05-16 DIAGNOSIS — R92.8 OTHER ABNORMAL AND INCONCLUSIVE FINDINGS ON DIAGNOSTIC IMAGING OF BREAST: ICD-10-CM

## 2023-06-09 ENCOUNTER — OUTPATIENT (OUTPATIENT)
Dept: OUTPATIENT SERVICES | Facility: HOSPITAL | Age: 53
LOS: 1 days | End: 2023-06-09
Payer: COMMERCIAL

## 2023-06-09 ENCOUNTER — RESULT REVIEW (OUTPATIENT)
Age: 53
End: 2023-06-09

## 2023-06-09 DIAGNOSIS — Z98.891 HISTORY OF UTERINE SCAR FROM PREVIOUS SURGERY: Chronic | ICD-10-CM

## 2023-06-09 DIAGNOSIS — Z00.8 ENCOUNTER FOR OTHER GENERAL EXAMINATION: ICD-10-CM

## 2023-06-09 DIAGNOSIS — R92.8 OTHER ABNORMAL AND INCONCLUSIVE FINDINGS ON DIAGNOSTIC IMAGING OF BREAST: ICD-10-CM

## 2023-06-09 PROCEDURE — C8937: CPT

## 2023-06-09 PROCEDURE — 77049 MRI BREAST C-+ W/CAD BI: CPT | Mod: 26

## 2023-06-09 PROCEDURE — 77049 MRI BREAST C-+ W/CAD BI: CPT

## 2023-06-09 PROCEDURE — A9579: CPT

## 2023-06-10 DIAGNOSIS — R92.8 OTHER ABNORMAL AND INCONCLUSIVE FINDINGS ON DIAGNOSTIC IMAGING OF BREAST: ICD-10-CM

## 2023-07-06 ENCOUNTER — RESULT REVIEW (OUTPATIENT)
Age: 53
End: 2023-07-06

## 2023-07-06 ENCOUNTER — OUTPATIENT (OUTPATIENT)
Dept: OUTPATIENT SERVICES | Facility: HOSPITAL | Age: 53
LOS: 1 days | End: 2023-07-06
Payer: COMMERCIAL

## 2023-07-06 DIAGNOSIS — R92.8 OTHER ABNORMAL AND INCONCLUSIVE FINDINGS ON DIAGNOSTIC IMAGING OF BREAST: ICD-10-CM

## 2023-07-06 DIAGNOSIS — Z98.891 HISTORY OF UTERINE SCAR FROM PREVIOUS SURGERY: Chronic | ICD-10-CM

## 2023-07-06 DIAGNOSIS — Z00.8 ENCOUNTER FOR OTHER GENERAL EXAMINATION: ICD-10-CM

## 2023-07-06 PROCEDURE — 77065 DX MAMMO INCL CAD UNI: CPT | Mod: 26,RT

## 2023-07-06 PROCEDURE — G0279: CPT

## 2023-07-06 PROCEDURE — 77065 DX MAMMO INCL CAD UNI: CPT | Mod: RT

## 2023-07-06 PROCEDURE — G0279: CPT | Mod: 26

## 2023-07-07 DIAGNOSIS — R92.8 OTHER ABNORMAL AND INCONCLUSIVE FINDINGS ON DIAGNOSTIC IMAGING OF BREAST: ICD-10-CM

## 2023-11-21 ENCOUNTER — LABORATORY RESULT (OUTPATIENT)
Age: 53
End: 2023-11-21

## 2023-11-22 ENCOUNTER — APPOINTMENT (OUTPATIENT)
Dept: OBGYN | Facility: CLINIC | Age: 53
End: 2023-11-22
Payer: COMMERCIAL

## 2023-11-22 VITALS — HEIGHT: 65 IN | TEMPERATURE: 97.3 F | BODY MASS INDEX: 23.66 KG/M2 | WEIGHT: 142 LBS

## 2023-11-22 DIAGNOSIS — D64.9 ANEMIA, UNSPECIFIED: ICD-10-CM

## 2023-11-22 DIAGNOSIS — Z01.419 ENCOUNTER FOR GYNECOLOGICAL EXAMINATION (GENERAL) (ROUTINE) W/OUT ABNORMAL FINDINGS: ICD-10-CM

## 2023-11-22 PROCEDURE — 99396 PREV VISIT EST AGE 40-64: CPT

## 2023-11-25 PROBLEM — Z01.419 WELL WOMAN EXAM WITH ROUTINE GYNECOLOGICAL EXAM: Status: ACTIVE | Noted: 2019-07-04

## 2023-11-25 PROBLEM — D64.9 ANEMIA: Status: ACTIVE | Noted: 2019-07-04

## 2024-01-05 ENCOUNTER — OUTPATIENT (OUTPATIENT)
Dept: OUTPATIENT SERVICES | Facility: HOSPITAL | Age: 54
LOS: 1 days | End: 2024-01-05
Payer: COMMERCIAL

## 2024-01-05 ENCOUNTER — RESULT REVIEW (OUTPATIENT)
Age: 54
End: 2024-01-05

## 2024-01-05 DIAGNOSIS — Z98.891 HISTORY OF UTERINE SCAR FROM PREVIOUS SURGERY: Chronic | ICD-10-CM

## 2024-01-05 DIAGNOSIS — Z00.8 ENCOUNTER FOR OTHER GENERAL EXAMINATION: ICD-10-CM

## 2024-01-05 DIAGNOSIS — R92.8 OTHER ABNORMAL AND INCONCLUSIVE FINDINGS ON DIAGNOSTIC IMAGING OF BREAST: ICD-10-CM

## 2024-01-05 PROCEDURE — G0279: CPT | Mod: 26

## 2024-01-05 PROCEDURE — G0279: CPT

## 2024-01-05 PROCEDURE — 77066 DX MAMMO INCL CAD BI: CPT

## 2024-01-05 PROCEDURE — 77066 DX MAMMO INCL CAD BI: CPT | Mod: 26

## 2024-01-06 DIAGNOSIS — R92.8 OTHER ABNORMAL AND INCONCLUSIVE FINDINGS ON DIAGNOSTIC IMAGING OF BREAST: ICD-10-CM

## 2024-02-24 ENCOUNTER — EMERGENCY (EMERGENCY)
Facility: HOSPITAL | Age: 54
LOS: 0 days | Discharge: HOME | End: 2024-02-24
Attending: EMERGENCY MEDICINE
Payer: COMMERCIAL

## 2024-02-24 VITALS
OXYGEN SATURATION: 100 % | WEIGHT: 141.1 LBS | HEIGHT: 65 IN | DIASTOLIC BLOOD PRESSURE: 78 MMHG | SYSTOLIC BLOOD PRESSURE: 133 MMHG | HEART RATE: 88 BPM | RESPIRATION RATE: 18 BRPM

## 2024-02-24 DIAGNOSIS — S61.210A LACERATION WITHOUT FOREIGN BODY OF RIGHT INDEX FINGER WITHOUT DAMAGE TO NAIL, INITIAL ENCOUNTER: ICD-10-CM
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
## 2024-02-24 DIAGNOSIS — Z98.891 HISTORY OF UTERINE SCAR FROM PREVIOUS SURGERY: Chronic | ICD-10-CM
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
## 2024-02-24 PROCEDURE — 12001 RPR S/N/AX/GEN/TRNK 2.5CM/<: CPT

## 2024-02-24 PROCEDURE — 99282 EMERGENCY DEPT VISIT SF MDM: CPT | Mod: 25

## 2024-02-24 PROCEDURE — 99284 EMERGENCY DEPT VISIT MOD MDM: CPT | Mod: 25

## 2024-02-24 NOTE — ED PROVIDER NOTE - ATTENDING APP SHARED VISIT CONTRIBUTION OF CARE
53-year-old female presents with laceration to right second finger.  Patient states laceration prior to arrival while washing a glass.  Patient states tetanus is up-to-date.  On exam patient in NAD, AAOx3, positive laceration to right index finger, good range of motion, positive bleeding, no foreign body

## 2024-02-24 NOTE — ED PROVIDER NOTE - OBJECTIVE STATEMENT
52 y/o female presents to the ED with laceration to right 2nd digit pta on glass . patient denies any tingling distally. no deformity. patient with good rom . utd w tetanus. +bleeding from wound

## 2024-02-24 NOTE — ED PROVIDER NOTE - PHYSICAL EXAMINATION
vital signs: I have reviewed the initial vital signs  constitutional: no acute distress, normocephalic  msk: extremity good rom, no bony tenderness, no deformity, good cap refill, no tendon injury , no foreign bodies  skin: +laceration v shaped 2.5 cm , without any tendon injury , +bleeding from wound, no swelling or bruising  neuro: no sensory or motor deficits of extremity. no focal deficits, gait steady, AOx3

## 2024-02-24 NOTE — ED PROVIDER NOTE - PATIENT PORTAL LINK FT
You can access the FollowMyHealth Patient Portal offered by [facility] by registering at the following website: [email or web address]. By joining [employer]’s FollowMyHealth portal, you will also be able to view your health information using other applications (apps) compatible with our system.

## 2024-02-24 NOTE — ED PROVIDER NOTE - NSFOLLOWUPINSTRUCTIONS_ED_ALL_ED_FT
Suture removal in 2 weeks.  Laceration    A laceration is a cut that goes through all of the layers of the skin and into the tissue that is right under the skin. Some lacerations heal on their own. Others need to be closed with skin adhesive strips, skin glue, stitches (sutures), or staples. Proper laceration care minimizes the risk of infection and helps the laceration to heal better.  If non-absorbable stitches or staples have been placed, they must be taken out within the time frame instructed by your healthcare provider.    SEEK IMMEDIATE MEDICAL CARE IF YOU HAVE ANY OF THE FOLLOWING SYMPTOMS: swelling around the wound, worsening pain, drainage from the wound, red streaking going away from your wound, inability to move finger or toe near the laceration, or discoloration of skin near the laceration.

## 2024-06-10 ENCOUNTER — OUTPATIENT (OUTPATIENT)
Dept: OUTPATIENT SERVICES | Facility: HOSPITAL | Age: 54
LOS: 1 days | End: 2024-06-10
Payer: COMMERCIAL

## 2024-06-10 DIAGNOSIS — Z98.891 HISTORY OF UTERINE SCAR FROM PREVIOUS SURGERY: Chronic | ICD-10-CM

## 2024-06-10 DIAGNOSIS — R92.8 OTHER ABNORMAL AND INCONCLUSIVE FINDINGS ON DIAGNOSTIC IMAGING OF BREAST: ICD-10-CM

## 2024-06-10 DIAGNOSIS — Z12.39 ENCOUNTER FOR OTHER SCREENING FOR MALIGNANT NEOPLASM OF BREAST: ICD-10-CM

## 2024-06-10 PROCEDURE — C8937: CPT

## 2024-06-10 PROCEDURE — 77049 MRI BREAST C-+ W/CAD BI: CPT | Mod: 26

## 2024-06-10 PROCEDURE — A9579: CPT

## 2024-06-10 PROCEDURE — 77049 MRI BREAST C-+ W/CAD BI: CPT

## 2024-06-11 DIAGNOSIS — R92.8 OTHER ABNORMAL AND INCONCLUSIVE FINDINGS ON DIAGNOSTIC IMAGING OF BREAST: ICD-10-CM

## 2024-06-26 ENCOUNTER — RESULT REVIEW (OUTPATIENT)
Age: 54
End: 2024-06-26

## 2024-06-26 ENCOUNTER — OUTPATIENT (OUTPATIENT)
Dept: OUTPATIENT SERVICES | Facility: HOSPITAL | Age: 54
LOS: 1 days | End: 2024-06-26
Payer: COMMERCIAL

## 2024-06-26 DIAGNOSIS — Z98.891 HISTORY OF UTERINE SCAR FROM PREVIOUS SURGERY: Chronic | ICD-10-CM

## 2024-06-26 DIAGNOSIS — R92.8 OTHER ABNORMAL AND INCONCLUSIVE FINDINGS ON DIAGNOSTIC IMAGING OF BREAST: ICD-10-CM

## 2024-06-26 PROCEDURE — 88305 TISSUE EXAM BY PATHOLOGIST: CPT

## 2024-06-26 PROCEDURE — 88342 IMHCHEM/IMCYTCHM 1ST ANTB: CPT | Mod: 26

## 2024-06-26 PROCEDURE — 88305 TISSUE EXAM BY PATHOLOGIST: CPT | Mod: 26

## 2024-06-26 PROCEDURE — 88341 IMHCHEM/IMCYTCHM EA ADD ANTB: CPT | Mod: 26

## 2024-06-26 PROCEDURE — A9579: CPT

## 2024-06-26 PROCEDURE — 88360 TUMOR IMMUNOHISTOCHEM/MANUAL: CPT

## 2024-06-26 PROCEDURE — 88341 IMHCHEM/IMCYTCHM EA ADD ANTB: CPT

## 2024-06-26 PROCEDURE — 77065 DX MAMMO INCL CAD UNI: CPT | Mod: RT

## 2024-06-26 PROCEDURE — 19085 BX BREAST 1ST LESION MR IMAG: CPT | Mod: RT

## 2024-06-26 PROCEDURE — 88342 IMHCHEM/IMCYTCHM 1ST ANTB: CPT

## 2024-06-26 PROCEDURE — A4648: CPT

## 2024-06-26 PROCEDURE — 77065 DX MAMMO INCL CAD UNI: CPT | Mod: 26,RT

## 2024-06-27 DIAGNOSIS — R92.8 OTHER ABNORMAL AND INCONCLUSIVE FINDINGS ON DIAGNOSTIC IMAGING OF BREAST: ICD-10-CM

## 2024-06-28 ENCOUNTER — NON-APPOINTMENT (OUTPATIENT)
Age: 54
End: 2024-06-28

## 2024-06-28 LAB — SURGICAL PATHOLOGY STUDY: SIGNIFICANT CHANGE UP

## 2024-07-18 ENCOUNTER — APPOINTMENT (OUTPATIENT)
Dept: BREAST CENTER | Facility: CLINIC | Age: 54
End: 2024-07-18
Payer: COMMERCIAL

## 2024-07-18 DIAGNOSIS — N60.82 OTHER BENIGN MAMMARY DYSPLASIAS OF LEFT BREAST: ICD-10-CM

## 2024-07-18 DIAGNOSIS — N60.92 UNSPECIFIED BENIGN MAMMARY DYSPLASIA OF LEFT BREAST: ICD-10-CM

## 2024-07-18 DIAGNOSIS — D05.01 LOBULAR CARCINOMA IN SITU OF RIGHT BREAST: ICD-10-CM

## 2024-07-18 PROCEDURE — 99204 OFFICE O/P NEW MOD 45 MIN: CPT

## 2024-08-07 ENCOUNTER — APPOINTMENT (OUTPATIENT)
Dept: HEMATOLOGY ONCOLOGY | Facility: CLINIC | Age: 54
End: 2024-08-07

## 2024-08-15 NOTE — DISCUSSION/SUMMARY
[FreeTextEntry1] : The visit was provided via telehealth using real-time 2-way audio visual technology. The patient, Gill Jimenez, was located in [place], [place] at the time of the visit. The provider, Kim Carbajal (genetic counselor) participated in the telehealth encounter. The genetic counselor was located in [place], [place] at the time of the appointment. Verbal consent for telehealth services was given by the patient. The patient was unaccompanied.  REASON FOR VISIT: Ms. Gill Salvador is a 53-year-old female who was referred by Dr. Paz for cancer genetic counseling and risk assessment due to a family history of breast cancer. The patient was seen on 2024 through [facility]. The patient was unaccompanied.   RELEVANT MEDICAL AND SURGICAL HISTORY: She has a history of left ADH/ALH s/p excision 2021 which showed: focal ADH, and flat FEA; both associated with psammomatous microcalcifications. Recent biopsy done on 24 noted LCIS. Lumpectomy planned for 24 with Dr. Paz.   OTHER MEDICAL AND SURGICAL HISTORY: -  - Hx of anemia   PAST OB/GYN HISTORY: Obstetrical History:  Age at Menarche: 13 Yuko-Menopausal Age at First Live Birth: 35 Oral Contraceptive Use: former use Hormone Replacement Therapy: Denied    CANCER SCREENING HISTORY: Breast: Last breast MRI 2024. Last mammogram 2024. She has a history of left ADH/ALH s/p excision x 2 in 2021 which showed: focal ADH, and flat FEA; both associated with psammomatous microcalcifications; Multiple foci of ALH. Recent biopsy done on 24 noted LCIS. Lumpectomy planned for 24 with Dr. Paz. GYN: Last visit 2024. Frequency: annual. Patient reported no abnormalities. Colon: Last colonoscopy  due to anemia, the patient reported no polyps. Skin: Some skin exams, no skin lesions removed.   SOCIAL HISTORY:  Tobacco-product use: Denied    FAMILY HISTORY: Paternal ancestry was reported as E[place] and maternal ancestry was reported as [place]. A detailed family history of cancer was ascertained, see below for pedigree. 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 The cousin has no personal history of calcemic abnormalities.    The remaining family history is unremarkable. According to the patient there are no other known cases of significant cancers in the family. To her knowledge no one else in the family has had germline testing for cancer susceptibility.   RISK ASSESSMENT: Ms. Jimenez's family history of cancer is suggestive of a hereditary cancer susceptibility syndrome. Variants in breast cancer susceptibility genes were of specific concern.  We discussed the CASR gene, which is associated with hyperparathyroidism and hypocalcemia. The patient denied any personal history of parathyroid issues or abnormal calcium levels and declined testing for the CASR gene.   We discussed the risks, benefits and limitations, financial cost and implications of genetic testing. We also discussed the psychosocial implications of genetic testing. Possible test results were reviewed with the patient, along with associated medical management options.   The patient was sent a consent form. Upon receipt of the consent, a saliva kit will be sent to the patient's home, which will then be sent to [facility] for analysis.   PLAN: 1. The patient was sent a consent form. Upon receipt of the consent, a saliva kit will be sent to the patient's home, which will then be sent to [facility] for analysis. 2. We will contact the patient once the results are available. Results generally return in 2-3 weeks.   For any additional questions please call Cancer Genetics at [phone] or [phone].     Kim Carbajal MS, [facility] Genetic Counselor, Cancer Genetics

## 2024-08-15 NOTE — DISCUSSION/SUMMARY
[FreeTextEntry1] : The visit was provided via telehealth using real-time 2-way audio visual technology. The patient, Gill Jimenez, was located in [place], [place] at the time of the visit. The provider, Kim Carbajal (genetic counselor) participated in the telehealth encounter. The genetic counselor was located in [place], [place] at the time of the appointment. Verbal consent for telehealth services was given by the patient. The patient was unaccompanied.  REASON FOR VISIT: Ms. Gill Salvador is a 53-year-old female who was referred by Dr. Paz for cancer genetic counseling and risk assessment due to a family history of breast cancer. The patient was seen on 2024 through [facility]. The patient was unaccompanied.   RELEVANT MEDICAL AND SURGICAL HISTORY: She has a history of left ADH/ALH s/p excision 2021 which showed: focal ADH, and flat FEA; both associated with psammomatous microcalcifications. Recent biopsy done on 24 noted LCIS. Lumpectomy planned for 24 with Dr. Paz.   OTHER MEDICAL AND SURGICAL HISTORY: -  - Hx of anemia   PAST OB/GYN HISTORY: Obstetrical History:  Age at Menarche: 13 Yuko-Menopausal Age at First Live Birth: 35 Oral Contraceptive Use: former use Hormone Replacement Therapy: Denied    CANCER SCREENING HISTORY: Breast: Last breast MRI 2024. Last mammogram 2024. She has a history of left ADH/ALH s/p excision x 2 in 2021 which showed: focal ADH, and flat FEA; both associated with psammomatous microcalcifications; Multiple foci of ALH. Recent biopsy done on 24 noted LCIS. Lumpectomy planned for 24 with Dr. Paz. GYN: Last visit 2024. Frequency: annual. Patient reported no abnormalities. Colon: Last colonoscopy  due to anemia, the patient reported no polyps. Skin: Some skin exams, no skin lesions removed.   SOCIAL HISTORY:  Tobacco-product use: Denied    FAMILY HISTORY: Paternal ancestry was reported as [place] and maternal ancestry was reported as [place]. A detailed family history of cancer was ascertained, see below for pedigree. 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 The cousin has no personal history of calcemic abnormalities.    The remaining family history is unremarkable. According to the patient there are no other known cases of significant cancers in the family. To her knowledge no one else in the family has had germline testing for cancer susceptibility.   RISK ASSESSMENT: Ms. Jimenez's family history of cancer is suggestive of a hereditary cancer susceptibility syndrome. Variants in breast cancer susceptibility genes were of specific concern.  We discussed the CASR gene, which is associated with hyperparathyroidism and hypocalcemia. The patient denied any personal history of parathyroid issues or abnormal calcium levels and declined testing for the CASR gene.   We discussed the risks, benefits and limitations, financial cost and implications of genetic testing. We also discussed the psychosocial implications of genetic testing. Possible test results were reviewed with the patient, along with associated medical management options.   The patient was sent a consent form. Upon receipt of the consent, a saliva kit will be sent to the patient's home, which will then be sent to [facility] for analysis.   PLAN: 1. The patient was sent a consent form. Upon receipt of the consent, a saliva kit will be sent to the patient's home, which will then be sent to [facility] for analysis. 2. We will contact the patient once the results are available. Results generally return in 2-3 weeks.   For any additional questions please call Cancer Genetics at [phone] or [phone].     Kim Carbajal MS, [facility] Genetic Counselor, Cancer Genetics

## 2024-08-15 NOTE — DISCUSSION/SUMMARY
[FreeTextEntry1] : The visit was provided via telehealth using real-time 2-way audio visual technology. The patient, Gill Jimenez, was located in [place], [place] at the time of the visit. The provider, Kim Carbajal (genetic counselor) participated in the telehealth encounter. The genetic counselor was located in [place], [place] at the time of the appointment. Verbal consent for telehealth services was given by the patient. The patient was unaccompanied.  REASON FOR VISIT: Ms. Gill Salvador is a 53-year-old female who was referred by Dr. Paz for cancer genetic counseling and risk assessment due to a family history of breast cancer. The patient was seen on 2024 through [facility]. The patient was unaccompanied.   RELEVANT MEDICAL AND SURGICAL HISTORY: She has a history of left ADH/ALH s/p excision 2021 which showed: focal ADH, and flat FEA; both associated with psammomatous microcalcifications. Recent biopsy done on 24 noted LCIS. Lumpectomy planned for 24 with Dr. Paz.   OTHER MEDICAL AND SURGICAL HISTORY: -  - Hx of anemia   PAST OB/GYN HISTORY: Obstetrical History:  Age at Menarche: 13 Yuko-Menopausal Age at First Live Birth: 35 Oral Contraceptive Use: former use Hormone Replacement Therapy: Denied    CANCER SCREENING HISTORY: Breast: Last breast MRI 2024. Last mammogram 2024. She has a history of left ADH/ALH s/p excision x 2 in 2021 which showed: focal ADH, and flat FEA; both associated with psammomatous microcalcifications; Multiple foci of ALH. Recent biopsy done on 24 noted LCIS. Lumpectomy planned for 24 with Dr. Paz. GYN: Last visit 2024. Frequency: annual. Patient reported no abnormalities. Colon: Last colonoscopy  due to anemia, the patient reported no polyps. Skin: Some skin exams, no skin lesions removed.   SOCIAL HISTORY:  Tobacco-product use: Denied    FAMILY HISTORY: Paternal ancestry was reported as Georgian and maternal ancestry was reported as Georgian. A detailed family history of cancer was ascertained, see below for pedigree. 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 The cousin has no personal history of calcemic abnormalities.    The remaining family history is unremarkable. According to the patient there are no other known cases of significant cancers in the family. To her knowledge no one else in the family has had germline testing for cancer susceptibility.   RISK ASSESSMENT: Ms. Jimenez's family history of cancer is suggestive of a hereditary cancer susceptibility syndrome. Variants in breast cancer susceptibility genes were of specific concern.  We discussed the CASR gene, which is associated with hyperparathyroidism and hypocalcemia. The patient denied any personal history of parathyroid issues or abnormal calcium levels and declined testing for the CASR gene.   We discussed the risks, benefits and limitations, financial cost and implications of genetic testing. We also discussed the psychosocial implications of genetic testing. Possible test results were reviewed with the patient, along with associated medical management options.   The patient was sent a consent form. Upon receipt of the consent, a saliva kit will be sent to the patient's home, which will then be sent to [facility] for analysis.   PLAN: 1. The patient was sent a consent form. Upon receipt of the consent, a saliva kit will be sent to the patient's home, which will then be sent to [facility] for analysis. 2. We will contact the patient once the results are available. Results generally return in 2-3 weeks.   For any additional questions please call Cancer Genetics at [phone] or [phone].     Kim Carbajal MS, [employer] [facility] [employer] Genetic Counselor, Cancer Genetics

## 2024-08-22 ENCOUNTER — OUTPATIENT (OUTPATIENT)
Dept: OUTPATIENT SERVICES | Facility: HOSPITAL | Age: 54
LOS: 1 days | End: 2024-08-22
Payer: COMMERCIAL

## 2024-08-22 VITALS
RESPIRATION RATE: 16 BRPM | DIASTOLIC BLOOD PRESSURE: 84 MMHG | SYSTOLIC BLOOD PRESSURE: 131 MMHG | OXYGEN SATURATION: 100 % | WEIGHT: 141.98 LBS | TEMPERATURE: 98 F | HEIGHT: 65 IN | HEART RATE: 57 BPM

## 2024-08-22 DIAGNOSIS — Z98.890 OTHER SPECIFIED POSTPROCEDURAL STATES: Chronic | ICD-10-CM

## 2024-08-22 DIAGNOSIS — Z01.818 ENCOUNTER FOR OTHER PREPROCEDURAL EXAMINATION: ICD-10-CM

## 2024-08-22 DIAGNOSIS — D05.01 LOBULAR CARCINOMA IN SITU OF RIGHT BREAST: ICD-10-CM

## 2024-08-22 DIAGNOSIS — Z98.891 HISTORY OF UTERINE SCAR FROM PREVIOUS SURGERY: Chronic | ICD-10-CM

## 2024-08-22 LAB
ALBUMIN SERPL ELPH-MCNC: 4.7 G/DL — SIGNIFICANT CHANGE UP (ref 3.5–5.2)
ALP SERPL-CCNC: 62 U/L — SIGNIFICANT CHANGE UP (ref 30–115)
ALT FLD-CCNC: 11 U/L — SIGNIFICANT CHANGE UP (ref 0–41)
ANION GAP SERPL CALC-SCNC: 10 MMOL/L — SIGNIFICANT CHANGE UP (ref 7–14)
AST SERPL-CCNC: 16 U/L — SIGNIFICANT CHANGE UP (ref 0–41)
BASOPHILS # BLD AUTO: 0.02 K/UL — SIGNIFICANT CHANGE UP (ref 0–0.2)
BASOPHILS NFR BLD AUTO: 0.3 % — SIGNIFICANT CHANGE UP (ref 0–1)
BILIRUB SERPL-MCNC: 0.4 MG/DL — SIGNIFICANT CHANGE UP (ref 0.2–1.2)
BUN SERPL-MCNC: 10 MG/DL — SIGNIFICANT CHANGE UP (ref 10–20)
CALCIUM SERPL-MCNC: 9.5 MG/DL — SIGNIFICANT CHANGE UP (ref 8.4–10.5)
CHLORIDE SERPL-SCNC: 103 MMOL/L — SIGNIFICANT CHANGE UP (ref 98–110)
CO2 SERPL-SCNC: 26 MMOL/L — SIGNIFICANT CHANGE UP (ref 17–32)
CREAT SERPL-MCNC: 0.7 MG/DL — SIGNIFICANT CHANGE UP (ref 0.7–1.5)
EGFR: 105 ML/MIN/1.73M2 — SIGNIFICANT CHANGE UP
EOSINOPHIL # BLD AUTO: 0.15 K/UL — SIGNIFICANT CHANGE UP (ref 0–0.7)
EOSINOPHIL NFR BLD AUTO: 2.5 % — SIGNIFICANT CHANGE UP (ref 0–8)
GLUCOSE SERPL-MCNC: 93 MG/DL — SIGNIFICANT CHANGE UP (ref 70–99)
HCT VFR BLD CALC: 41.6 % — SIGNIFICANT CHANGE UP (ref 37–47)
HGB BLD-MCNC: 12.8 G/DL — SIGNIFICANT CHANGE UP (ref 12–16)
IMM GRANULOCYTES NFR BLD AUTO: 0.2 % — SIGNIFICANT CHANGE UP (ref 0.1–0.3)
LYMPHOCYTES # BLD AUTO: 2.06 K/UL — SIGNIFICANT CHANGE UP (ref 1.2–3.4)
LYMPHOCYTES # BLD AUTO: 34.1 % — SIGNIFICANT CHANGE UP (ref 20.5–51.1)
MCHC RBC-ENTMCNC: 25.2 PG — LOW (ref 27–31)
MCHC RBC-ENTMCNC: 30.8 G/DL — LOW (ref 32–37)
MCV RBC AUTO: 82.1 FL — SIGNIFICANT CHANGE UP (ref 81–99)
MONOCYTES # BLD AUTO: 0.44 K/UL — SIGNIFICANT CHANGE UP (ref 0.1–0.6)
MONOCYTES NFR BLD AUTO: 7.3 % — SIGNIFICANT CHANGE UP (ref 1.7–9.3)
NEUTROPHILS # BLD AUTO: 3.36 K/UL — SIGNIFICANT CHANGE UP (ref 1.4–6.5)
NEUTROPHILS NFR BLD AUTO: 55.6 % — SIGNIFICANT CHANGE UP (ref 42.2–75.2)
NRBC # BLD: 0 /100 WBCS — SIGNIFICANT CHANGE UP (ref 0–0)
PLATELET # BLD AUTO: 221 K/UL — SIGNIFICANT CHANGE UP (ref 130–400)
PMV BLD: 12.2 FL — HIGH (ref 7.4–10.4)
POTASSIUM SERPL-MCNC: 4.2 MMOL/L — SIGNIFICANT CHANGE UP (ref 3.5–5)
POTASSIUM SERPL-SCNC: 4.2 MMOL/L — SIGNIFICANT CHANGE UP (ref 3.5–5)
PROT SERPL-MCNC: 7.4 G/DL — SIGNIFICANT CHANGE UP (ref 6–8)
RBC # BLD: 5.07 M/UL — SIGNIFICANT CHANGE UP (ref 4.2–5.4)
RBC # FLD: 14.4 % — SIGNIFICANT CHANGE UP (ref 11.5–14.5)
SODIUM SERPL-SCNC: 139 MMOL/L — SIGNIFICANT CHANGE UP (ref 135–146)
WBC # BLD: 6.04 K/UL — SIGNIFICANT CHANGE UP (ref 4.8–10.8)
WBC # FLD AUTO: 6.04 K/UL — SIGNIFICANT CHANGE UP (ref 4.8–10.8)

## 2024-08-22 PROCEDURE — 99214 OFFICE O/P EST MOD 30 MIN: CPT | Mod: 25

## 2024-08-22 PROCEDURE — 93005 ELECTROCARDIOGRAM TRACING: CPT

## 2024-08-22 PROCEDURE — 93010 ELECTROCARDIOGRAM REPORT: CPT

## 2024-08-22 PROCEDURE — 36415 COLL VENOUS BLD VENIPUNCTURE: CPT

## 2024-08-22 PROCEDURE — 80053 COMPREHEN METABOLIC PANEL: CPT

## 2024-08-22 PROCEDURE — 85025 COMPLETE CBC W/AUTO DIFF WBC: CPT

## 2024-08-22 NOTE — H&P PST ADULT - HISTORY OF PRESENT ILLNESS
Patient is a 53 year old  female presenting to PAST in preparation for RIGHT BREAST LUMPECTOMY RIGHT BREAST NEEDLE LOCALIZATION  on 9-  under  Local Standby anesthesia by Dr. Dorota Paz .    Patient states she has been monitoring her left breast for the past 2-3 years due to being high risk for breast CA.  Denies changes in left breast, bruising or nipple discharge    PATIENT  CURRENTLY DENIES CHEST PAIN  SHORTNESS OF BREATH  PALPITATIONS,  DYSURIA, OR UPPER RESPIRATORY INFECTION IN PAST 2 WEEKS    denies travel outside the [place] in the past 30 days    Anesthesia Alert  yes--Difficult Airway Class 3 airway  NO--History of neck surgery or radiation  NO--Limited ROM of neck  NO--History of Malignant hyperthermia  NO--No personal or family history of Pseudocholinesterase deficiency.  NO--Prior Anesthesia Complication  NO--Latex Allergy  NO--Loose teeth  NO--History of Rheumatoid Arthritis  NO--Bleeding risk  NO--DAVID  NO--Other_____    PT  DENIES ANY RASHES, ABRASION, OR OPEN WOUNDS OR CUTS    AS PER THE PATIENT, THIS IS HIS/HER COMPLETE MEDICAL AND SURGICAL HX, INCLUDING MEDICATIONS PRESCRIBED AND OVER THE COUNTER    Patient  communicated understanding of instructions and was given the opportunity to ask questions and have them answered.    pt denies any suicidal ideation or thoughts, pt states not a threat to self or others    Revised Cardiac Risk Index for Pre-Operative Risk from [employer]  on 8/22/2024  ** All calculations should be rechecked by clinician prior to use **    RESULT SUMMARY:  0 points  Class I Risk    3.9 %  30-day risk of death, MI, or cardiac arrest<br><br>From [place] 2017. These numbers are higher than those from the original study (Abelino 1999). See Evidence for details.      INPUTS:  Elevated-risk surgery —> 0 = No  History of ischemic heart disease —> 0 = No  History of congestive heart failure —> 0 = No  History of cerebrovascular disease —> 0 = No  Pre-operative treatment with insulin —> 0 = No  Pre-operative creatinine >2 mg/dL / 176.8 µmol/L —> 0 = No    Duke Activity Status Index (DASI) from [employer]  on 8/22/2024  ** All calculations should be rechecked by clinician prior to use **    RESULT SUMMARY:  58.2 points  The higher the score (maximum 58.2), the higher the functional status.    9.89 METs        INPUTS:  Take care of self —> 2.75 = Yes  Walk indoors —> 1.75 = Yes  Walk 1&ndash;2 blocks on level ground —> 2.75 = Yes  Climb a flight of stairs or walk up a hill —> 5.5 = Yes  Run a short distance —> 8 = Yes  Do light work around the house —> 2.7 = Yes  Do moderate work around the house —> 3.5 = Yes  Do heavy work around the house —> 8 = Yes  Do yardwork —> 4.5 = Yes  Have sexual relations —> 5.25 = Yes  Participate in moderate recreational activities —> 6 = Yes  Participate in strenuous sports —> 7.5 = Yes   Patient is a 53 year old  female presenting to PAST in preparation for RIGHT BREAST LUMPECTOMY RIGHT BREAST NEEDLE LOCALIZATION, TISSUE TRANSFER on 9-  under  Local Standby anesthesia by Dr. Dorota Paz .    Patient states she has been monitoring her left breast for the past 2-3 years due to being high risk for breast CA.  Denies changes in left breast, bruising or nipple discharge    PATIENT  CURRENTLY DENIES CHEST PAIN  SHORTNESS OF BREATH  PALPITATIONS,  DYSURIA, OR UPPER RESPIRATORY INFECTION IN PAST 2 WEEKS    denies travel outside the [place] in the past 30 days    Anesthesia Alert  yes--Difficult Airway Class 3 airway  NO--History of neck surgery or radiation  NO--Limited ROM of neck  NO--History of Malignant hyperthermia  NO--No personal or family history of Pseudocholinesterase deficiency.  NO--Prior Anesthesia Complication  NO--Latex Allergy  NO--Loose teeth  NO--History of Rheumatoid Arthritis  NO--Bleeding risk  NO--DAVID  NO--Other_____    PT  DENIES ANY RASHES, ABRASION, OR OPEN WOUNDS OR CUTS    AS PER THE PATIENT, THIS IS HIS/HER COMPLETE MEDICAL AND SURGICAL HX, INCLUDING MEDICATIONS PRESCRIBED AND OVER THE COUNTER    Patient  communicated understanding of instructions and was given the opportunity to ask questions and have them answered.    pt denies any suicidal ideation or thoughts, pt states not a threat to self or others    Revised Cardiac Risk Index for Pre-Operative Risk from [employer]  on 8/22/2024  ** All calculations should be rechecked by clinician prior to use **    RESULT SUMMARY:  0 points  Class I Risk    3.9 %  30-day risk of death, MI, or cardiac arrest<br><br>From [place] 2017. These numbers are higher than those from the original study (Abelino 1999). See Evidence for details.      INPUTS:  Elevated-risk surgery —> 0 = No  History of ischemic heart disease —> 0 = No  History of congestive heart failure —> 0 = No  History of cerebrovascular disease —> 0 = No  Pre-operative treatment with insulin —> 0 = No  Pre-operative creatinine >2 mg/dL / 176.8 µmol/L —> 0 = No    Duke Activity Status Index (DASI) from [employer]  on 8/22/2024  ** All calculations should be rechecked by clinician prior to use **    RESULT SUMMARY:  58.2 points  The higher the score (maximum 58.2), the higher the functional status.    9.89 METs        INPUTS:  Take care of self —> 2.75 = Yes  Walk indoors —> 1.75 = Yes  Walk 1&ndash;2 blocks on level ground —> 2.75 = Yes  Climb a flight of stairs or walk up a hill —> 5.5 = Yes  Run a short distance —> 8 = Yes  Do light work around the house —> 2.7 = Yes  Do moderate work around the house —> 3.5 = Yes  Do heavy work around the house —> 8 = Yes  Do yardwork —> 4.5 = Yes  Have sexual relations —> 5.25 = Yes  Participate in moderate recreational activities —> 6 = Yes  Participate in strenuous sports —> 7.5 = Yes

## 2024-08-22 NOTE — H&P PST ADULT - REASON FOR ADMISSION
Case Type: OP Block TimeSuite: CASProceduralist: Dorota Paz  Confirmed Surgery DateTime: 09- - 0:00PAST DateTime: 08- - 15:00Procedure: RIGHT BREAST LUMPECTOMY RIGHT BREAST NEEDLE LOCALIZATION  ERP?: NoLaterality: N/ALength of Procedure: 60 Minutes  Anesthesia Type: Local Standby Case Type: OP Block TimeSuite: CASProceduralist: Dorota Paz  Confirmed Surgery DateTime: 09- - 0:00PAST DateTime: 08- - 15:00Procedure: RIGHT BREAST LUMPECTOMY RIGHT BREAST NEEDLE LOCALIZATION, TISSUE TRANSFER  ERP?: NoLaterality: N/ALength of Procedure: 60 Minutes  Anesthesia Type: Local Standby

## 2024-08-23 DIAGNOSIS — D05.01 LOBULAR CARCINOMA IN SITU OF RIGHT BREAST: ICD-10-CM

## 2024-08-23 DIAGNOSIS — Z01.818 ENCOUNTER FOR OTHER PREPROCEDURAL EXAMINATION: ICD-10-CM

## 2024-08-26 ENCOUNTER — OUTPATIENT (OUTPATIENT)
Dept: OUTPATIENT SERVICES | Facility: HOSPITAL | Age: 54
LOS: 1 days | End: 2024-08-26

## 2024-08-26 DIAGNOSIS — Z00.8 ENCOUNTER FOR OTHER GENERAL EXAMINATION: ICD-10-CM

## 2024-08-26 DIAGNOSIS — Z98.891 HISTORY OF UTERINE SCAR FROM PREVIOUS SURGERY: Chronic | ICD-10-CM

## 2024-08-26 DIAGNOSIS — Z98.890 OTHER SPECIFIED POSTPROCEDURAL STATES: Chronic | ICD-10-CM

## 2024-08-27 DIAGNOSIS — Z00.8 ENCOUNTER FOR OTHER GENERAL EXAMINATION: ICD-10-CM

## 2024-09-03 NOTE — ASU PATIENT PROFILE, ADULT - FALL HARM RISK - UNIVERSAL INTERVENTIONS
given that the patient is uni-nephric and the ct showed obstructed left kidney  Patient will need left double J stent insertion  All risks and benefits were discussed with the patient and his wife   patient consent for the procedure and will add him on for emergency double J stent insertion
77 yo male w/ PMH CKD 3 in setting of solitary L kidney s/p R nephrectomy 2/2 malignant neoplasm (05/2022), HTN, TURBT, Pulm nodule s/p VATS LORRIE (8/2021), Hypothyroidism presenting to ED for abnormal outpatient lab values concerning for elevated serum potassium of 5.7 and Scr of 6.07 on 5/18. In the ED, found to have a serum potassium of 5.9 with a SCO2 of 14 and SCr of 6.85. Nephrology consulted for hyperkalemia, metabolic acidosis and GAMAL on CKD.    last SCr of 2.12 on 4/21/23. CT in the ED demonstrated L hydronephrosis with filling defect in mid ureter, now s/p stent placement and Cordero.     -gamal on ckd- sec to obstruction. will trend renal function after stent placement. will discuss with urology- if needs surgery again on right kidney, high chances of needing dialysis after. has no proteinuria so unlikely glomerular disease.   - Hyperkalemia- sec to acute obstruction and acute kidney injury. Management as above.   no dialysis need     ad[facility]  nephrology attending   please contact me on TEAMS   Office- [phone]
Bed in lowest position, wheels locked, appropriate side rails in place/Call bell, personal items and telephone in reach/Instruct patient to call for assistance before getting out of bed or chair/Non-slip footwear when patient is out of bed/[place] to call system/Physically safe environment - no spills, clutter or unnecessary equipment/Purposeful Proactive Rounding/Room/bathroom lighting operational, light cord in reach

## 2024-09-03 NOTE — ASU PATIENT PROFILE, ADULT - NSICDXPASTMEDICALHX_GEN_ALL_CORE_FT
PAST MEDICAL HISTORY:  2019 novel coronavirus disease (COVID-19) x2    H/O vitamin D deficiency     History of iron deficiency

## 2024-09-04 ENCOUNTER — OUTPATIENT (OUTPATIENT)
Dept: OUTPATIENT SERVICES | Facility: HOSPITAL | Age: 54
LOS: 1 days | Discharge: HOME | End: 2024-09-04
Payer: COMMERCIAL

## 2024-09-04 ENCOUNTER — RESULT REVIEW (OUTPATIENT)
Age: 54
End: 2024-09-04

## 2024-09-04 ENCOUNTER — APPOINTMENT (OUTPATIENT)
Dept: BREAST CENTER | Facility: AMBULATORY SURGERY CENTER | Age: 54
End: 2024-09-04

## 2024-09-04 VITALS
RESPIRATION RATE: 20 BRPM | SYSTOLIC BLOOD PRESSURE: 135 MMHG | OXYGEN SATURATION: 98 % | HEART RATE: 60 BPM | DIASTOLIC BLOOD PRESSURE: 80 MMHG

## 2024-09-04 VITALS
TEMPERATURE: 98 F | DIASTOLIC BLOOD PRESSURE: 83 MMHG | OXYGEN SATURATION: 98 % | HEIGHT: 65 IN | SYSTOLIC BLOOD PRESSURE: 146 MMHG | WEIGHT: 141.98 LBS | HEART RATE: 66 BPM | RESPIRATION RATE: 18 BRPM

## 2024-09-04 DIAGNOSIS — D05.01 LOBULAR CARCINOMA IN SITU OF RIGHT BREAST: ICD-10-CM

## 2024-09-04 DIAGNOSIS — Z98.891 HISTORY OF UTERINE SCAR FROM PREVIOUS SURGERY: Chronic | ICD-10-CM

## 2024-09-04 DIAGNOSIS — Z98.890 OTHER SPECIFIED POSTPROCEDURAL STATES: Chronic | ICD-10-CM

## 2024-09-04 PROBLEM — D64.9 ANEMIA, UNSPECIFIED: Chronic | Status: INACTIVE | Noted: 2021-01-18 | Resolved: 2024-09-04

## 2024-09-04 PROCEDURE — 19281 PERQ DEVICE BREAST 1ST IMAG: CPT | Mod: RT

## 2024-09-04 PROCEDURE — 88342 IMHCHEM/IMCYTCHM 1ST ANTB: CPT

## 2024-09-04 PROCEDURE — 88341 IMHCHEM/IMCYTCHM EA ADD ANTB: CPT

## 2024-09-04 PROCEDURE — 88305 TISSUE EXAM BY PATHOLOGIST: CPT

## 2024-09-04 PROCEDURE — 19301 PARTIAL MASTECTOMY: CPT | Mod: RT

## 2024-09-04 PROCEDURE — 14001 TIS TRNFR TRUNK 10.1-30SQCM: CPT | Mod: RT

## 2024-09-04 PROCEDURE — 88341 IMHCHEM/IMCYTCHM EA ADD ANTB: CPT | Mod: 26

## 2024-09-04 PROCEDURE — 88360 TUMOR IMMUNOHISTOCHEM/MANUAL: CPT

## 2024-09-04 PROCEDURE — C1889: CPT

## 2024-09-04 PROCEDURE — 88305 TISSUE EXAM BY PATHOLOGIST: CPT | Mod: 26

## 2024-09-04 PROCEDURE — 88342 IMHCHEM/IMCYTCHM 1ST ANTB: CPT | Mod: 26

## 2024-09-04 RX ORDER — L.ACIDOPH/B.ANIMALIS/B.LONGUM 15B CELL
1 CAPSULE ORAL
Refills: 0 | DISCHARGE

## 2024-09-04 RX ORDER — OXYCODONE HYDROCHLORIDE 5 MG/1
5 TABLET ORAL ONCE
Refills: 0 | Status: DISCONTINUED | OUTPATIENT
Start: 2024-09-04 | End: 2024-09-04

## 2024-09-04 RX ORDER — ONDANSETRON 2 MG/ML
4 INJECTION, SOLUTION INTRAMUSCULAR; INTRAVENOUS ONCE
Refills: 0 | Status: DISCONTINUED | OUTPATIENT
Start: 2024-09-04 | End: 2024-09-04

## 2024-09-04 RX ORDER — ERGOCALCIFEROL (VITAMIN D2) 200 MCG/ML
0 DROPS ORAL
Refills: 0 | DISCHARGE

## 2024-09-04 RX ORDER — ACETAMINOPHEN 325 MG/1
1000 TABLET ORAL ONCE
Refills: 0 | Status: COMPLETED | OUTPATIENT
Start: 2024-09-04 | End: 2024-09-04

## 2024-09-04 RX ORDER — HYDROMORPHONE HYDROCHLORIDE 2 MG/1
0.5 TABLET ORAL
Refills: 0 | Status: DISCONTINUED | OUTPATIENT
Start: 2024-09-04 | End: 2024-09-04

## 2024-09-04 RX ADMIN — ACETAMINOPHEN 1000 MILLIGRAM(S): 325 TABLET ORAL at 14:04

## 2024-09-04 RX ADMIN — Medication 100 MILLILITER(S): at 13:25

## 2024-09-04 NOTE — BRIEF OPERATIVE NOTE - FIRST ASSIST
RD ASSESSMENT & RECOMMENDATIONS

SEE CARE ACTIVITY FOR COMPLETE ASSESSMENT



DAILY ESTIMATED NEEDS:

Needs based on Critical Care / 68kg 

22-30  kcals/kg 

9017-8850  total kcals

1.25-2  g protein/kg

  g total protein 

25-30  mL/kg

6299-7542  total fluid mLs



NUTRITION DIAGNOSIS:

* Swallowing difficulty R/T dysphagia, poor dentition, dementia as

evidenced by on liquify pureed, NTL texture diet per SLP rec-> now on NNGT

feeds-> now s/p PEG placement.

* Increased kcal/prot needs R/T wound healing and sepsis as evidenced by

admitted w/ DTPI R heel and non-blanchable sacral erythema, critically

elev wbc (24.0* ->12.5), elev LA (5.8 -> wnl), elev CRP, now afebrile.

* Altered nutrition related lab values R/T CHF, diabetes as evidenced by

elev BNP (>22530), A1C of 10.4, elev BGs and POC glu, 200's- now improved.





CURRENT TF:Glucerna 1.2 @60 x 24hrs 

 



ENTERAL NUTRITION RECOMMENDATIONS:

Glucerna 1.5 @50ml/hr x24 hrs  to provide 1200ml, 1800 kcal, 99g pro, 911ml free H2O 



- With stability, as medically able, rec switch from Glucerna 1.2 to

  Glucerna 1.5. Start @20ml/hr, advance slowly to goal.

- HOB over 45 degrees/ flush per MD





ADDITIONAL RECOMMENDATIONS:

* Calibrated bedscale wt w/ added P200 mattress  + daily wts (on lasix)

* Wound healing: add MVI x1, Vit C 250mg QD 

                        Zaki 1pkt BID with diet order 

* Add folic acid 1mg QD (low folate 7.2) 

* Monitor renal fxn: BUN/ creat wnl  

     -> now intubated, monitor for ability to feed (now initiated) 

* TF change as above Resident/Fellow

## 2024-09-04 NOTE — CHART NOTE - NSCHARTNOTEFT_GEN_A_CORE
PACU ANESTHESIA ADMISSION NOTE      Procedure: Right Breast Needle Localization Lumpectomy  Post op diagnosis:  Right Breast Lobular Carcinoma    ____  Intubated  TV:______       Rate: ______      FiO2: ______    _x___  Patent Airway    ____  Full return of protective reflexes    _x___  Full recovery from anesthesia / back to baseline     Vitals:   T:   98        R:      14            BP:      100/76            Sat:  100                 P: 60      Mental Status:  __x__ Awake   _____ Alert   _____ Drowsy   _____ Sedated    Nausea/Vomiting:  _x___ NO  ______Yes,   See Post - Op Orders          Pain Scale (0-10):  _____    Treatment: ____ None    ____x See Post - Op/PCA Orders    Post - Operative Fluids:   _x___ Oral   ____ See Post - Op Orders    Plan: Discharge:   _x___Home       _____Floor     _____Critical Care    _____  Other:_________________    Comments:

## 2024-09-04 NOTE — ASU DISCHARGE PLAN (ADULT/PEDIATRIC) - ASU DC SPECIAL INSTRUCTIONSFT
SURGERY DISCHARGE INSTRUCTIONS    You are being discharged from [facility] after right breast lumpectomy.    FOLLOW-UP - with Dr. Paz. You should already have a scheduled appointment. Please call the office if you have any questions/concerns.    DIET - regular.     ACTIVITY- No heavy lifting for 4-6 wks over 10-20 lbs. Walking is encouraged. No running or swimming. Please wear your surgical bra at all times for the next 48 hours.    WOUNDCARE - The purple glue over your incisions will come off with time. May shower 24 hours after surgery but no submerging wound under water for 2 weeks (tub bathing). Pat area dry when wet, keep clean and dry. Do not apply powders or lotion to wound area.     PAIN MEDS - Take over the counter extra strength tylenol 500mg and/or ibprofen 400mg with food every 6 hours for pain. No more than 4g of tylenol in 24hrs or 1g in 4 hrs.    OTHER MEDS - If you have any questions about your other regular home medications please call your primary care physician or the physician who prescribed those medications to you.     If you develop fever, dizziness, chest pain, trouble breathing, nausea, vomiting, increasing abdominal pain, inability to pass bowel movements, redness/pain/discharge from incisions. Please call the office or go to the emergency room immediately.

## 2024-09-04 NOTE — ASU DISCHARGE PLAN (ADULT/PEDIATRIC) - CARE PROVIDER_API CALL
Dorota Paz  Surgery  [address] [place] [address], [address] [place] [address]  Phone: [phone]  Fax: [phone]  Follow Up Time:

## 2024-09-04 NOTE — BRIEF OPERATIVE NOTE - NSICDXBRIEFPROCEDURE_GEN_ALL_CORE_FT
PROCEDURES:  Lumpectomy of right breast with needle localization 04-Sep-2024 13:03:09  Kayla Arechiga

## 2024-09-04 NOTE — ASU DISCHARGE PLAN (ADULT/PEDIATRIC) - NS MD DC FALL RISK RISK
For information on Fall & Injury Prevention, visit: [email or web address] OR  [email or web address] OR  https://www.cdc.gov/steadi/patient.html

## 2024-09-04 NOTE — BRIEF OPERATIVE NOTE - OPERATION/FINDINGS
Right breast lumpectomy performed. Wire localizer in place at start of procedure. Breast mass excised using electrical cautery. Seed localizer confirmed  a mass intra-operatively. Irrigated with saline. Hemostasis achieved. Incision closed with monocryl and vicryl sutures. Covered with dermabond, Kerlix, and surgical bra.

## 2024-09-10 ENCOUNTER — NON-APPOINTMENT (OUTPATIENT)
Age: 54
End: 2024-09-10

## 2024-09-11 LAB — SURGICAL PATHOLOGY STUDY: SIGNIFICANT CHANGE UP

## 2024-09-12 DIAGNOSIS — N63.10 UNSPECIFIED LUMP IN THE RIGHT BREAST, UNSPECIFIED QUADRANT: ICD-10-CM

## 2024-09-12 DIAGNOSIS — D24.1 BENIGN NEOPLASM OF RIGHT BREAST: ICD-10-CM

## 2024-09-12 DIAGNOSIS — N60.81 OTHER BENIGN MAMMARY DYSPLASIAS OF RIGHT BREAST: ICD-10-CM

## 2024-09-12 DIAGNOSIS — N60.21 FIBROADENOSIS OF RIGHT BREAST: ICD-10-CM

## 2024-09-12 DIAGNOSIS — D05.01 LOBULAR CARCINOMA IN SITU OF RIGHT BREAST: ICD-10-CM

## 2024-09-12 NOTE — DISCUSSION/SUMMARY
[FreeTextEntry1] : REASON FOR VISIT: Ms. Gill Jimenez is a 53-year-old female who was called on 9/10/2024 for a discussion regarding her negative genetic test results related to hereditary cancer predisposition.    RELEVANT MEDICAL AND SURGICAL HISTORY: She has a history of left ADH/ALH s/p excision 2021 which showed: focal ADH, and flat FEA; both associated with psammomatous microcalcifications. Recent biopsy done on 24 noted LCIS. Lumpectomy planned for 24 with Dr. Paz.  OTHER MEDICAL AND SURGICAL HISTORY: -  - Hx of anemia  PAST OB/GYN HISTORY: Obstetrical History:  Age at Menarche: 13 Yuk[place]-Menopausal Age at First Live Birth: 35 Oral Contraceptive Use: former use Hormone Replacement Therapy: Denied  CANCER SCREENING HISTORY: Breast: Last breast MRI 2024. Last mammogram 2024. She has a history of left ADH/ALH s/p excision x 2 in 2021 which showed: focal ADH, and flat FEA; both associated with psammomatous microcalcifications; Multiple foci of ALH. Recent biopsy done on 24 noted LCIS. Lumpectomy planned for 24 with Dr. Paz. GYN: Last visit 2024. Frequency: annual. Patient reported no abnormalities. Colon: Last colonoscopy  due to anemia, the patient reported no polyps. Skin: Some skin exams, no skin lesions removed.  SOCIAL HISTORY:  Tobacco-product use: Denied  FAMILY HISTORY: Paternal ancestry was reported as Turkish and maternal ancestry was reported as Turkish. A detailed family history of cancer was ascertained, see below for pedigree. Of note: - Paternal grandmother with breast cancer in her early 40s - Paternal cousin with breast cancer at 49, negative genetics for breast cancer, however, she tested positive for a pathogenic variant in the gene CASR c.1394G>A ([id number]) which is associated with a spectrum of disorders including autosomal dominant familial hypocalciuric hypercalcemia (FHH), autosomal dominant hypocalcemia (ADH), ADH with Bartter syndrome, autosomal recessive  severe hyperparathyroidism (NSHPT), and possibly familial isolated hyperparathyroidism (FIHP). The cousin has no personal history of calcemic abnormalities.  The remaining family history is unremarkable. According to the patient there are no other known cases of significant cancers in the family. To her knowledge no one else in the family has had germline testing for cancer susceptibility   TEST RESULTS: NEGATIVE   NO pathogenic (disease-causing) variants or variants of uncertain significance were detected in the following genes:  ARIEL, BAP1, BARD1, BRCA1, BRCA2, BRIP1, CDH1, CHEK2, DICER1, FH, FLCN, MET, MLH1, MSH2, MSH6, NF1, PALB2, PMS2, PTEN, RAD51C, RAD51D, SDHA, SDHB, SDHC, SDHD, SMARCA4, STK11, TP53, TSC1, TSC2 and VHL (sequencing and deletion/duplication); MITF (sequencing only); EPCAM (deletion/duplication only)    RESULTS INTERPRETATION AND ASSESSMENT: Given Ms. Jimenez's current reported personal history of ADH/ALH, family history of cancer, and her negative genetic test results, the following screening guidelines and risk-reducing recommendations were discussed: Breast: Ms. Jimenez has a personal history of ADH/ALH. The patient should continue her ongoing high risk breast cancer screening as recommended by her breast team. Other: In the absence of other indications, the patient should practice age-appropriate cancer screening for all other organ systems as recommended for the general population.   It is recommended that the patient discuss the importance of pursuing cancer genetic testing and counseling with her other relatives. There may be a pathogenic variant in the family which the patient did not inherit. We would be happy to meet with her family members or refer them to a genetic expert in their area.   We also discussed that, while no clear cause of the patient's personal ALH/ADH and family history of cancer was identified, this result, while reassuring, does not entirely rule out a hereditary cancer risk in the patient. It is possible the patient has a mutation in one of the genes tested that is not detectable by this analysis, or has a mutation in a different gene, either known or unknown. It is also possible there is a hereditary cancer predisposition in the family, but the patient did not inherit it.   Our knowledge of genetics and inherited cancer conditions is changing rapidly, therefore, we recommend that the patient contact our office, every 2 to 3 years, to discuss relevant advances in cancer genetics. We emphasize the importance of re-contacting us with updates regarding her personal and family history of cancer as well as any updates regarding additional cancer genetic test results performed for the patient and/or family members.  Such updates could possibly change our risk assessment and recommendations.   PLAN: 1. Long-term management and surveillance should be based on the patient's personal and family history and general population guidelines for all other cancers. 2. A copy of the genetic test results is available to the patient in the [facility] portal. 3. The patient was encouraged to contact us every 2-3 years to discuss relevant advances in cancer genetics, or sooner if there are any changes in her personal or family history of cancer.   For any additional questions please call Cancer Genetics at [phone] or [phone].   Kim Carbajal MS, [facility]  Genetic Counselor, Cancer Genetics

## 2024-09-12 NOTE — DISCUSSION/SUMMARY
[FreeTextEntry1] : REASON FOR VISIT: Ms. Gill Jimenez is a 53-year-old female who was called on 9/10/2024 for a discussion regarding her negative genetic test results related to hereditary cancer predisposition.    RELEVANT MEDICAL AND SURGICAL HISTORY: She has a history of left ADH/ALH s/p excision 2021 which showed: focal ADH, and flat FEA; both associated with psammomatous microcalcifications. Recent biopsy done on 24 noted LCIS. Lumpectomy planned for 24 with Dr. Paz.  OTHER MEDICAL AND SURGICAL HISTORY: -  - Hx of anemia  PAST OB/GYN HISTORY: Obstetrical History:  Age at Menarche: 13 [place]-Menopausal Age at First Live Birth: 35 Oral Contraceptive Use: former use Hormone Replacement Therapy: Denied  CANCER SCREENING HISTORY: Breast: Last breast MRI 2024. Last mammogram 2024. She has a history of left ADH/ALH s/p excision x 2 in 2021 which showed: focal ADH, and flat FEA; both associated with psammomatous microcalcifications; Multiple foci of ALH. Recent biopsy done on 24 noted LCIS. Lumpectomy planned for 24 with Dr. Paz. GYN: Last visit 2024. Frequency: annual. Patient reported no abnormalities. Colon: Last colonoscopy  due to anemia, the patient reported no polyps. Skin: Some skin exams, no skin lesions removed.  SOCIAL HISTORY:  Tobacco-product use: Denied  FAMILY HISTORY: Paternal ancestry was reported as Indonesian and maternal ancestry was reported as Indonesian. A detailed family history of cancer was ascertained, see below for pedigree. Of note: - Paternal grandmother with breast cancer in her early 40s - Paternal cousin with breast cancer at 49, negative genetics for breast cancer, however, she tested positive for a pathogenic variant in the gene CASR c.1394G>A ([id number]) which is associated with a spectrum of disorders including autosomal dominant familial hypocalciuric hypercalcemia (FHH), autosomal dominant hypocalcemia (ADH), ADH with Bartter syndrome, autosomal recessive  severe hyperparathyroidism (NSHPT), and possibly familial isolated hyperparathyroidism (FIHP). The cousin has no personal history of calcemic abnormalities.  The remaining family history is unremarkable. According to the patient there are no other known cases of significant cancers in the family. To her knowledge no one else in the family has had germline testing for cancer susceptibility   TEST RESULTS: NEGATIVE   NO pathogenic (disease-causing) variants or variants of uncertain significance were detected in the following genes:  ARIEL, BAP1, BARD1, BRCA1, BRCA2, BRIP1, CDH1, CHEK2, DICER1, FH, FLCN, MET, MLH1, MSH2, MSH6, NF1, PALB2, PMS2, PTEN, RAD51C, RAD51D, SDHA, SDHB, SDHC, SDHD, SMARCA4, STK11, TP53, TSC1, TSC2 and VHL (sequencing and deletion/duplication); MITF (sequencing only); EPCAM (deletion/duplication only)    RESULTS INTERPRETATION AND ASSESSMENT: Given Ms. Jimenez's current reported personal history of ADH/ALH, family history of cancer, and her negative genetic test results, the following screening guidelines and risk-reducing recommendations were discussed: Breast: Ms. Jimenez has a personal history of ADH/ALH. The patient should continue her ongoing high risk breast cancer screening as recommended by her breast team. Other: In the absence of other indications, the patient should practice age-appropriate cancer screening for all other organ systems as recommended for the general population.   It is recommended that the patient discuss the importance of pursuing cancer genetic testing and counseling with her other relatives. There may be a pathogenic variant in the family which the patient did not inherit. We would be happy to meet with her family members or refer them to a genetic expert in their area.   We also discussed that, while no clear cause of the patient's personal ALH/ADH and family history of cancer was identified, this result, while reassuring, does not entirely rule out a hereditary cancer risk in the patient. It is possible the patient has a mutation in one of the genes tested that is not detectable by this analysis, or has a mutation in a different gene, either known or unknown. It is also possible there is a hereditary cancer predisposition in the family, but the patient did not inherit it.   Our knowledge of genetics and inherited cancer conditions is changing rapidly, therefore, we recommend that the patient contact our office, every 2 to 3 years, to discuss relevant advances in cancer genetics. We emphasize the importance of re-contacting us with updates regarding her personal and family history of cancer as well as any updates regarding additional cancer genetic test results performed for the patient and/or family members.  Such updates could possibly change our risk assessment and recommendations.   PLAN: 1. Long-term management and surveillance should be based on the patient's personal and family history and general population guidelines for all other cancers. 2. A copy of the genetic test results is available to the patient in the [employer] portal. 3. The patient was encouraged to contact us every 2-3 years to discuss relevant advances in cancer genetics, or sooner if there are any changes in her personal or family history of cancer.   For any additional questions please call Cancer Genetics at [phone] or [phone].   Kim Carbajal MS, [facility]  Genetic Counselor, Cancer Genetics

## 2024-09-16 ENCOUNTER — APPOINTMENT (OUTPATIENT)
Dept: BREAST CENTER | Facility: CLINIC | Age: 54
End: 2024-09-16
Payer: COMMERCIAL

## 2024-09-16 DIAGNOSIS — N64.89 OTHER SPECIFIED DISORDERS OF BREAST: ICD-10-CM

## 2024-09-16 DIAGNOSIS — D05.01 LOBULAR CARCINOMA IN SITU OF RIGHT BREAST: ICD-10-CM

## 2024-09-16 PROCEDURE — 99024 POSTOP FOLLOW-UP VISIT: CPT

## 2024-09-17 PROBLEM — N64.89 RADIAL SCAR OF RIGHT BREAST: Status: ACTIVE | Noted: 2024-09-17

## 2024-09-17 PROBLEM — U07.1 COVID-19: Chronic | Status: ACTIVE | Noted: 2024-09-04

## 2024-09-17 PROBLEM — Z86.39 PERSONAL HISTORY OF OTHER ENDOCRINE, NUTRITIONAL AND METABOLIC DISEASE: Chronic | Status: ACTIVE | Noted: 2024-09-04

## 2024-09-17 NOTE — PHYSICAL EXAM
[Normocephalic] : normocephalic [EOMI] : extra ocular movement intact [No Rashes] : no rashes [No Ulceration] : no ulceration [de-identified] : nL respirations [de-identified] : Incision site healing well with no signs of infection/dehiscence

## 2024-09-17 NOTE — HISTORY OF PRESENT ILLNESS
[FreeTextEntry1] : Patient is 53F with right cLCIS s/p lumpectomy on 9/4/24; cLCIS and radial scar  She has a history of left ADH/ALH s/p excision x 2 in 2/201 which showed:  focal ADH, and flat FEA; both associated with psammomatous microcalcifications; Multiple foci of ALH.  Healing prior biopsy site.  Left UOQ Mass = healing prior biopsy site with no residual epithelial atypia identified in this entirely submitted specimen.  (+) family hx - breast cancer - paternal grandmother.  Babs Model Risk Assessment: 5 yr - 5.1%  Life - 35.2%   Her prior workup is as follows: s/p Stereo Guided Core Bx - 12/09/2020: Left, lateral Ca++: (mini-cork) Single microscopic focus of atypical lobular hyperplasia (ALH), measuring 0.8 mm (microscopic measurement) in this biopsy material (see comment). Proliferative type fibrocystic changes including usual type duct hyperplasia.  Left, medial Ca++: (top-hat) Mild focal ductal epithelial atypia (ADH)  Focal atypical lobular hyperplasia (ALH, see comment). Proliferative type fibrocystic changes including usual type duct hyperplasia.  s/p Left NLOC x 2 - 02/12/2021 = Left LIQ Mass = focal ADH, and flat FEA; both associated with psammomatous microcalcifications; Multiple foci of ALH.  Healing prior biopsy site.  Left UOQ Mass = healing prior biopsy site with no residual epithelial atypia identified in this entirely submitted specimen.  Most recent work-up: B/L Dx Mammo - 01/05/2024: -The breasts are extremely dense, which lowers the sensitivity of mammography. -Regional amorphous calcifications in the right lateral breast are not significantly changed from 12/14/2021 and thought to be benign.  -No evidence of a suspicious architectural distortion, mass or calcifications in either breast. IMPRESSION: No mammographic evidence of malignancy. Stable right-sided breast calcifications are benign. BI-RADS Category 2: Benign  B/L Breast MRI - 06/10/2024: RIGHT BREAST: -There is a 4.0 cm segmental area of nonmass enhancement in the lateral aspect of the right breast (series 501 image 70).  MRI guided biopsy recommended. -There is no evidence of skin thickening or nipple retraction.  -There is no axillary lymphadenopathy. LEFT BREAST: -There is no suspicious enhancement in the left breast. -There is no evidence of skin thickening or nipple retraction.  -There is no axillary lymphadenopathy. The imaged portions of the chest and abdomen are unremarkable. BI-RADS Category 4: Suspicious   MRI Guided Core Bx - 06/26/2024: Right, lateral nonmass enhancement, 4.0cm: (cork) -Lobular carcinoma in situ (LCIS), classical type. -Sclerosing intraductal papilloma containing florid duct hyperplasia. -Pseudoangiomatous stromal hyperplasia (PASH), cystic/papillary apocrine metaplasia, and proliferative type fibrocystic changes associated with phosphate microcalcifications.  This is benign, high risk, concordant histology. Surgical management is recommended.  9/4/24: Breast, right mass, needle localized lumpectomy: - Lobular carcinoma in situ (LCIS), classical type (see comment); arising  in the background of a complex sclerosing lesion (radial scar). - Small hyalinized fibroadenoma, cystic/papillary apocrine metaplasia, and prior biopsy site changes. - Proliferative type fibrocystic changes including florid duct hyperplasia, stromal fibrosis, sclerosing adenosis, benign myoepithelial cell hyperplasia, and phosphate microcalcifications.  Comment: The diagnosis is supported by immunohistochemical stain negative and/or weak/diminished for E-cadherin in the foci of in-situ atypical lobular neoplasia (ALH and/or LCIS).   Pt presents to the office for her initial post-operative visit, s/p Right breast needle localized lumpectomy on 09/04/2024. She is feeling well. She denies any fever / chills or erythema and / or drainage related to the incision. Her pain is well controlled, only complains of mild soreness of the area.

## 2024-09-17 NOTE — PHYSICAL EXAM
[Normocephalic] : normocephalic [EOMI] : extra ocular movement intact [No Rashes] : no rashes [No Ulceration] : no ulceration [de-identified] : nL respirations [de-identified] : Incision site healing well with no signs of infection/dehiscence

## 2024-09-17 NOTE — DATA REVIEWED
[FreeTextEntry1] : [facility] Accession Number : [id number] Patient:     LUANNE HILLS   Accession:                             [id number]  Collected Date/Time:                   9/4/2024 12:25 EDT Received Date/Time:                    9/4/2024 12:49 EDT  Surgical Pathology Report - Auth (Verified)  Specimen(s) Submitted Right breast mass Time obtained: 12:25 pm Cold ischemic time <1 hour  Final Diagnosis Breast, right mass, needle localized lumpectomy: - Lobular carcinoma in situ (LCIS), classical type (see comment); arising  in the background of a complex sclerosing lesion (radial scar). - Small hyalinized fibroadenoma, cystic/papillary apocrine metaplasia, and prior biopsy site changes. - Proliferative type fibrocystic changes including florid duct hyperplasia, stromal fibrosis, sclerosing adenosis, benign myoepithelial cell hyperplasia, and phosphate microcalcifications.  Comment: The diagnosis is supported by immunohistochemical stain negative and/or weak/diminished for E-cadherin in the foci of in-situ atypical lobular neoplasia (ALH and/or LCIS).  Note:  All controls show appropriate reactivity.  These immunohistochemical tests have been developed and their performance characteristics determined by [facility],  [address] [place] [address]. It has not been cleared or approved by the U.S. Food and Drug administration.  The FDA has determined that such clearance or approval is not necessary.  This test is used for clinical purposes.  The laboratory is certified under the CLIA-88 as qualified to perform high complexity clinical testing.  These assays have not been validated on decalcified tissues.  Results should be interpreted with caution given the likelihood of false negativity on decalcified specimen.  The interpretation of this test was performed at [facility], [address] [place] [address], [facility].  Verified by: Rich Coker M.D.

## 2024-09-17 NOTE — DATA REVIEWED
[FreeTextEntry1] : [facility] Accession Number : [id number] Patient:     LUANNE HILLS   Accession:                             [id number]  Collected Date/Time:                   9/4/2024 12:25 EDT Received Date/Time:                    9/4/2024 12:49 EDT  Surgical Pathology Report - Auth (Verified)  Specimen(s) Submitted Right breast mass Time obtained: 12:25 pm Cold ischemic time <1 hour  Final Diagnosis Breast, right mass, needle localized lumpectomy: - Lobular carcinoma in situ (LCIS), classical type (see comment); arising  in the background of a complex sclerosing lesion (radial scar). - Small hyalinized fibroadenoma, cystic/papillary apocrine metaplasia, and prior biopsy site changes. - Proliferative type fibrocystic changes including florid duct hyperplasia, stromal fibrosis, sclerosing adenosis, benign myoepithelial cell hyperplasia, and phosphate microcalcifications.  Comment: The diagnosis is supported by immunohistochemical stain negative and/or weak/diminished for E-cadherin in the foci of in-situ atypical lobular neoplasia (ALH and/or LCIS).  Note:  All controls show appropriate reactivity.  These immunohistochemical tests have been developed and their performance characteristics determined by [facility],  [address] [place] [address]. It has not been cleared or approved by the U.S. Food and Drug administration.  The FDA has determined that such clearance or approval is not necessary.  This test is used for clinical purposes.  The laboratory is certified under the CLIA-88 as qualified to perform high complexity clinical testing.  These assays have not been validated on decalcified tissues.  Results should be interpreted with caution given the likelihood of false negativity on decalcified specimen.  The interpretation of this test was performed at [facility], [address] [place] [address], [facility].  Verified by: Rich Coker M.D.

## 2024-09-17 NOTE — ASSESSMENT
[FreeTextEntry1] : Patient is a 53F with right cLCIS s/p lumpectomy on 9/4/24: cLCIS and radial scar.   She has a history of left ALH/ADH s/p excision x 2 in 2/2021.   She underwent bilateral mmg in 1/2024 that showed no suspicious findings (BIRADS 2). She had an MRI in 6/2024 which showed right lateral 4cm area of enhancement, biopsied as cLCIS (duncan, concordant).     She is s/p lumpectomy on 9/4/24: cLCIS and radial scar.   We discussed her pathology in detail and that there was no upgrade to carcinoma. I explained that LCIS is a high-risk lesion for developing breast cancer in both breasts, regardless of in which breast the excision was performed. The patient was counseled that the risk of breast cancer is approximately 1-2% per year, with a lifetime risk of over 20%. She is for follow up with medical oncology regarding possible chemoppx.   We also discussed continued high risk screening in the future.   All questions and concerns were answered in detail.   Patient is for bilateral mmg/us in 2/2025. She is for follow up after imaging, pending any interval changes. For follow up with medical oncology regarding chemoppx. For further discussion of continued high rissk screening.   Total time spent on encounter was greater than 20 minutes , which included face to face time with the patient, performing an exam, reviewing previous medical records, reviewing current imaging/ pathology, documenting in patient record and coordinating care/imaging. Greater than 50% of the encounter was spent on counseling and coordination of her breast issue.

## 2024-09-17 NOTE — REASON FOR VISIT
[Post Op: _________] : a [unfilled] post op visit [FreeTextEntry1] : s/p Right breast needle localized lumpectomy on 09/04/2024. [FreeTextEntry2] : 09/04/2024

## 2024-11-23 ENCOUNTER — LABORATORY RESULT (OUTPATIENT)
Age: 54
End: 2024-11-23

## 2024-11-26 ENCOUNTER — LABORATORY RESULT (OUTPATIENT)
Age: 54
End: 2024-11-26

## 2024-11-26 ENCOUNTER — APPOINTMENT (OUTPATIENT)
Dept: OBGYN | Facility: CLINIC | Age: 54
End: 2024-11-26
Payer: COMMERCIAL

## 2024-11-26 VITALS — WEIGHT: 144 LBS | BODY MASS INDEX: 23.99 KG/M2 | HEIGHT: 65 IN | TEMPERATURE: 97.2 F

## 2024-11-26 DIAGNOSIS — Z01.419 ENCOUNTER FOR GYNECOLOGICAL EXAMINATION (GENERAL) (ROUTINE) W/OUT ABNORMAL FINDINGS: ICD-10-CM

## 2024-11-26 DIAGNOSIS — N39.490 STRESS INCONTINENCE (FEMALE) (MALE): ICD-10-CM

## 2024-11-26 DIAGNOSIS — N39.3 STRESS INCONTINENCE (FEMALE) (MALE): ICD-10-CM

## 2024-11-26 DIAGNOSIS — D05.01 LOBULAR CARCINOMA IN SITU OF RIGHT BREAST: ICD-10-CM

## 2024-11-26 PROCEDURE — 99396 PREV VISIT EST AGE 40-64: CPT

## 2024-11-27 PROBLEM — N39.3 OVERFLOW STRESS INCONTINENCE OF URINE IN FEMALE: Status: ACTIVE | Noted: 2024-11-27

## 2025-01-11 ENCOUNTER — RESULT REVIEW (OUTPATIENT)
Age: 55
End: 2025-01-11

## 2025-01-11 ENCOUNTER — OUTPATIENT (OUTPATIENT)
Dept: OUTPATIENT SERVICES | Facility: HOSPITAL | Age: 55
LOS: 1 days | End: 2025-01-11
Payer: COMMERCIAL

## 2025-01-11 DIAGNOSIS — Z98.891 HISTORY OF UTERINE SCAR FROM PREVIOUS SURGERY: Chronic | ICD-10-CM

## 2025-01-11 DIAGNOSIS — Z98.890 OTHER SPECIFIED POSTPROCEDURAL STATES: Chronic | ICD-10-CM

## 2025-01-11 PROCEDURE — 77066 DX MAMMO INCL CAD BI: CPT

## 2025-01-11 PROCEDURE — G0279: CPT

## 2025-01-11 PROCEDURE — 76641 ULTRASOUND BREAST COMPLETE: CPT | Mod: 26,50

## 2025-01-11 PROCEDURE — 76641 ULTRASOUND BREAST COMPLETE: CPT | Mod: 50

## 2025-01-11 PROCEDURE — 77066 DX MAMMO INCL CAD BI: CPT | Mod: 26

## 2025-01-11 PROCEDURE — G0279: CPT | Mod: 26

## 2025-01-12 DIAGNOSIS — D05.01 LOBULAR CARCINOMA IN SITU OF RIGHT BREAST: ICD-10-CM

## 2025-01-23 ENCOUNTER — APPOINTMENT (OUTPATIENT)
Dept: BREAST CENTER | Facility: CLINIC | Age: 55
End: 2025-01-23
Payer: COMMERCIAL

## 2025-01-23 VITALS
HEART RATE: 83 BPM | BODY MASS INDEX: 23.99 KG/M2 | HEIGHT: 65 IN | SYSTOLIC BLOOD PRESSURE: 131 MMHG | DIASTOLIC BLOOD PRESSURE: 85 MMHG | WEIGHT: 144 LBS

## 2025-01-23 DIAGNOSIS — D05.01 LOBULAR CARCINOMA IN SITU OF RIGHT BREAST: ICD-10-CM

## 2025-01-23 DIAGNOSIS — N60.02 SOLITARY CYST OF LEFT BREAST: ICD-10-CM

## 2025-01-23 DIAGNOSIS — N60.92 UNSPECIFIED BENIGN MAMMARY DYSPLASIA OF LEFT BREAST: ICD-10-CM

## 2025-01-23 DIAGNOSIS — Z12.39 ENCOUNTER FOR OTHER SCREENING FOR MALIGNANT NEOPLASM OF BREAST: ICD-10-CM

## 2025-01-23 PROCEDURE — 99214 OFFICE O/P EST MOD 30 MIN: CPT

## 2025-01-27 PROBLEM — N60.02 CYST OF LEFT BREAST: Status: ACTIVE | Noted: 2025-01-27

## 2025-03-14 ENCOUNTER — RESULT REVIEW (OUTPATIENT)
Age: 55
End: 2025-03-14

## 2025-03-14 ENCOUNTER — OUTPATIENT (OUTPATIENT)
Dept: OUTPATIENT SERVICES | Facility: HOSPITAL | Age: 55
LOS: 1 days | End: 2025-03-14
Payer: COMMERCIAL

## 2025-03-14 DIAGNOSIS — N63.20 UNSPECIFIED LUMP IN THE LEFT BREAST, UNSPECIFIED QUADRANT: ICD-10-CM

## 2025-03-14 DIAGNOSIS — Z98.890 OTHER SPECIFIED POSTPROCEDURAL STATES: Chronic | ICD-10-CM

## 2025-03-14 DIAGNOSIS — Z98.891 HISTORY OF UTERINE SCAR FROM PREVIOUS SURGERY: Chronic | ICD-10-CM

## 2025-03-14 DIAGNOSIS — N63.0 UNSPECIFIED LUMP IN UNSPECIFIED BREAST: ICD-10-CM

## 2025-03-14 PROCEDURE — G0279: CPT | Mod: 26

## 2025-03-14 PROCEDURE — G0279: CPT

## 2025-03-14 PROCEDURE — 76642 ULTRASOUND BREAST LIMITED: CPT | Mod: 26,LT

## 2025-03-14 PROCEDURE — 77065 DX MAMMO INCL CAD UNI: CPT | Mod: 26,LT

## 2025-03-14 PROCEDURE — 77065 DX MAMMO INCL CAD UNI: CPT | Mod: LT

## 2025-03-14 PROCEDURE — 76642 ULTRASOUND BREAST LIMITED: CPT | Mod: LT

## 2025-03-15 DIAGNOSIS — N63.20 UNSPECIFIED LUMP IN THE LEFT BREAST, UNSPECIFIED QUADRANT: ICD-10-CM

## 2025-04-07 RX ORDER — NITROFURANTOIN (MONOHYDRATE/MACROCRYSTALS) 25; 75 MG/1; MG/1
100 CAPSULE ORAL
Qty: 10 | Refills: 0 | Status: ACTIVE | COMMUNITY
Start: 2025-04-07 | End: 1900-01-01

## 2025-04-17 NOTE — ASU PREOP CHECKLIST - NS PREOP CHK CHLOROHEX WASH
Caller: Demi Leija    Relationship to patient: Self    Patient is needing: PATIENT IS ASKING THAT [facility] CALL IN 2 DIFLUCAN TABLETS FOR HER AFTER BEING PRESCRIBED ANTIBIOTICS ON MONDAY.    PATIENT WANTS IT SENT TO [facility] [place].        
Sent    
at home:

## 2025-06-19 ENCOUNTER — OUTPATIENT (OUTPATIENT)
Dept: OUTPATIENT SERVICES | Facility: HOSPITAL | Age: 55
LOS: 1 days | End: 2025-06-19
Payer: COMMERCIAL

## 2025-06-19 ENCOUNTER — RESULT REVIEW (OUTPATIENT)
Age: 55
End: 2025-06-19

## 2025-06-19 DIAGNOSIS — Z98.890 OTHER SPECIFIED POSTPROCEDURAL STATES: Chronic | ICD-10-CM

## 2025-06-19 DIAGNOSIS — Z12.39 ENCOUNTER FOR OTHER SCREENING FOR MALIGNANT NEOPLASM OF BREAST: ICD-10-CM

## 2025-06-19 DIAGNOSIS — Z98.891 HISTORY OF UTERINE SCAR FROM PREVIOUS SURGERY: Chronic | ICD-10-CM

## 2025-06-19 DIAGNOSIS — R92.2 INCONCLUSIVE MAMMOGRAM: ICD-10-CM

## 2025-06-19 PROCEDURE — 77049 MRI BREAST C-+ W/CAD BI: CPT | Mod: 26

## 2025-06-19 PROCEDURE — A9579: CPT

## 2025-06-19 PROCEDURE — 77049 MRI BREAST C-+ W/CAD BI: CPT

## 2025-06-19 PROCEDURE — C8937: CPT

## 2025-06-20 DIAGNOSIS — Z12.39 ENCOUNTER FOR OTHER SCREENING FOR MALIGNANT NEOPLASM OF BREAST: ICD-10-CM

## 2025-06-20 DIAGNOSIS — R92.2 INCONCLUSIVE MAMMOGRAM: ICD-10-CM

## 2025-08-15 DIAGNOSIS — R92.30 DENSE BREASTS, UNSPECIFIED: ICD-10-CM
